# Patient Record
Sex: MALE | Race: WHITE | NOT HISPANIC OR LATINO | Employment: FULL TIME | URBAN - METROPOLITAN AREA
[De-identification: names, ages, dates, MRNs, and addresses within clinical notes are randomized per-mention and may not be internally consistent; named-entity substitution may affect disease eponyms.]

---

## 2023-07-28 ENCOUNTER — OFFICE VISIT (OUTPATIENT)
Dept: PRIMARY CARE | Facility: CLINIC | Age: 48
End: 2023-07-28
Payer: COMMERCIAL

## 2023-07-28 VITALS
SYSTOLIC BLOOD PRESSURE: 138 MMHG | DIASTOLIC BLOOD PRESSURE: 100 MMHG | TEMPERATURE: 97.7 F | WEIGHT: 221.8 LBS | OXYGEN SATURATION: 96 % | RESPIRATION RATE: 18 BRPM | BODY MASS INDEX: 31.75 KG/M2 | HEART RATE: 57 BPM | HEIGHT: 70 IN

## 2023-07-28 DIAGNOSIS — R53.81 MALAISE AND FATIGUE: ICD-10-CM

## 2023-07-28 DIAGNOSIS — R53.83 MALAISE AND FATIGUE: ICD-10-CM

## 2023-07-28 DIAGNOSIS — Z12.5 SCREENING PSA (PROSTATE SPECIFIC ANTIGEN): ICD-10-CM

## 2023-07-28 PROCEDURE — 99203 OFFICE O/P NEW LOW 30 MIN: CPT | Performed by: FAMILY MEDICINE

## 2023-07-28 PROCEDURE — 1036F TOBACCO NON-USER: CPT | Performed by: FAMILY MEDICINE

## 2023-07-28 RX ORDER — MELOXICAM 15 MG/1
1 TABLET ORAL DAILY
COMMUNITY
Start: 2023-07-24 | End: 2023-10-31 | Stop reason: ALTCHOICE

## 2023-07-28 ASSESSMENT — PATIENT HEALTH QUESTIONNAIRE - PHQ9
SUM OF ALL RESPONSES TO PHQ9 QUESTIONS 1 AND 2: 0
1. LITTLE INTEREST OR PLEASURE IN DOING THINGS: NOT AT ALL
2. FEELING DOWN, DEPRESSED OR HOPELESS: NOT AT ALL

## 2023-07-28 NOTE — PROGRESS NOTES
Subjective   Patient ID: Leopoldo Ko is a 48 y.o. male who presents for Establish Care.    HPI  Presents today for above reason  [facility]: [facility]  How did pt find us: Orthopedic office referred  Last eye exam: 1 yr. Pt has lasix  Last dental: 4-5 months  Eating healthy. Including: chicken, fish, fruit, vegetables  Sleeping well    Pt has torn LEFT meniscus and needed a PCP to  clear him  There is no date for surgery      BW as ordered/ Declined PSA test  Pt states he had [facility]    Review of Systems  Constitutional:  no chills, no fever and no night sweats.  Eyes: no blurred vision and no eyesight problems.  ENT: no hearing loss, no nasal congestion, no hoarseness and no sore throat.  Neck: no mass (es) and no swelling.  Cardiovascular: no chest pain, no intermittent leg claudication, no lower extremity edema, no palpitation and no syncope.  Respiratory: no cough, no shortness of breath during exertion, no shortness of breath at rest and no wheezing.  Gastrointestinal: no abdominal pain, no blood in stools, no constipation, no diarrhea, no melena, no nausea, no rectal pain and no vomiting.  Genitourinary: no dysuria, no change in urinary frequency, no urinary hesitancy and no feelings of urinary urgency.  Musculoskeletal: no arthralgias, no back pain and no myalgias.  Integumentary: no new skin lesions and no rashes.  Neurological: no difficulty walking, no headache, no limb weakness, no numbness and no tingling.  Psychiatric/Behavioral: no anxiety, no depression, no anhedonia and no substance use disorders.  Endocrine: no recent weight gain and no recent weight loss.  Hematologic/Lymphatic: no tendency for easy bruising and no swollen glands    Objective   Physical Exam  Patient here to establish care.  Injured his left knee he believes 6 months or so ago intermittent pain with running got worse and worse some intermittent locking and clicking MRI with orthopedic physician documents large  "tear of the meniscus in the left knee needs PCP for clearance.  Also chronic left shoulder pain injured that when he was in the  years ago.  No chronic medical problems blood pressure elevated today states he does not typically have a history of high blood pressure although he is had some red bull and caffeine this morning.  No family history of hypertension no significant medical problems.  Well-developed well-nourished male in no acute distress physical exam today's office visit constitutional alert and oriented x3.    Head is atraumatic HEENT is within normal limits.    Neck supple no masses full range of motion.    Thyroid is normal in size no thyromegaly there is no carotid bruits.    Pulmonary exam shows clear to auscultation no respiratory distress.    Cardiovascular shows no murmur rub or gallop.  Regular rate and rhythm.    Abdominal exam soft nontender no hepatosplenomegaly or masses normal bowel sounds no rebound no guarding.    Musculoskeletal exam no joint pain no muscle pain full range of motion.    Psych exam normal mood and affect.    Dermatologic exam no skin lesions no rash no blemishes.    Neuro exam is no focal deficits.  Normal exam.    Extremities no edema normal pulses normal capillary refill.  Pain tenderness and minimal swelling of the left knee is intermittently like clicking.  Blood pressure recheck my check is 152/102 markedly elevated.  BP (!) 138/100   Pulse 57   Temp 36.5 °C (97.7 °F)   Resp 18   Ht 1.778 m (5' 10\")   Wt 101 kg (221 lb 12.8 oz)   SpO2 96%   BMI 31.82 kg/m²     No results found for: \"WBC\", \"HGB\", \"HCT\", \"MCV\", \"PLT\"    Assessment/Plan plan is to have avoid salt and caffeine follow-up in 2 weeks for blood pressure recheck your blood drawn follow-up when we have those results.  He will contact Dr. Shrestha's office to get on the schedule for the knee surgery.  Problem List Items Addressed This Visit    None  Visit Diagnoses       Screening PSA (prostate " specific antigen)        Malaise and fatigue

## 2023-08-10 NOTE — PROGRESS NOTES
Subjective   Patient ID: Leopoldo Ko is a 48 y.o. male who presents for Pre-op Exam.  HPI  Patient presents today for a Pre-Op Exam. Is having a left knee scope on 8-31-23 at [facility], performed by Dr. Markie Shrestha. Blood work done on 8-7-23 with Dr. Shrestha. EKG was also done with them.     States the Meloxicam wears off by the afternoon. Wondering if it can  be taken BID instead of every day, or if there is something else he can take?      Taking current medications which were reviewed.  Problem list discussed.    Overall doing well.  Eating okay.  Staying active.    Has no other new problem /question.    Review of Systems  Constitutional:  no chills, no fever and no night sweats.  Eyes: no blurred vision and no eyesight problems.  ENT: no hearing loss, no nasal congestion, no hoarseness and no sore throat.  Neck: no mass (es) and no swelling.  Cardiovascular: no chest pain, no intermittent leg claudication, no lower extremity edema, no palpitation and no syncope.  Respiratory: no cough, no shortness of breath during exertion, no shortness of breath at rest and no wheezing.  Gastrointestinal: no abdominal pain, no blood in stools, no constipation, no diarrhea, no melena, no nausea, no rectal pain and no vomiting.  Genitourinary: no dysuria, no change in urinary frequency, no urinary hesitancy and no feelings of urinary urgency.  Musculoskeletal: no arthralgias, no back pain and no myalgias.  Integumentary: no new skin lesions and no rashes.  Neurological: no difficulty walking, no headache, no limb weakness, no numbness and no tingling.  Psychiatric/Behavioral: no anxiety, no depression, no anhedonia and no substance use disorders.  Endocrine: no recent weight gain and no recent weight loss.  Hematologic/Lymphatic: no tendency for easy bruising and no swollen glands    Objective   Physical Exam  Here for physical for clearance for left knee history of medial meniscal tear osteoarthritis due to have it  "scoped at the end of the month.  EKG is supposedly normal done by Dr. Shrestha's office.  He has no other complaints..Normal adult pain and tenderness at the left knee especially medially he has to stop his meloxicam 7 days before he is already having significant pain concerned that the left more pain when he asked to stop the meloxicam.  /86   Pulse 88   Temp 36.8 °C (98.2 °F)   Resp 16   Ht 1.778 m (5' 10\")   Wt 101 kg (221 lb 12.8 oz)   SpO2 99%   BMI 31.82 kg/m²     Lab Results   Component Value Date    WBC 3.4 (L) 08/07/2023    HGB 14.5 08/07/2023    HCT 43.4 08/07/2023    MCV 97 08/07/2023     08/07/2023       Assessment/Plan plan follow-up after the procedure is given pain medication to take as needed leading up to the surgery and then afterwards  Problem List Items Addressed This Visit    None  Visit Diagnoses       Pre-op exam    -  Primary    Chronic pain of left knee                "

## 2023-08-11 ENCOUNTER — OFFICE VISIT (OUTPATIENT)
Dept: PRIMARY CARE | Facility: CLINIC | Age: 48
End: 2023-08-11
Payer: COMMERCIAL

## 2023-08-11 VITALS
OXYGEN SATURATION: 99 % | BODY MASS INDEX: 31.75 KG/M2 | RESPIRATION RATE: 16 BRPM | DIASTOLIC BLOOD PRESSURE: 86 MMHG | TEMPERATURE: 98.2 F | SYSTOLIC BLOOD PRESSURE: 124 MMHG | HEART RATE: 88 BPM | WEIGHT: 221.8 LBS | HEIGHT: 70 IN

## 2023-08-11 DIAGNOSIS — M25.562 CHRONIC PAIN OF LEFT KNEE: ICD-10-CM

## 2023-08-11 DIAGNOSIS — G89.29 CHRONIC PAIN OF LEFT KNEE: ICD-10-CM

## 2023-08-11 DIAGNOSIS — Z01.818 PRE-OP EXAM: Primary | ICD-10-CM

## 2023-08-11 PROCEDURE — 1036F TOBACCO NON-USER: CPT | Performed by: FAMILY MEDICINE

## 2023-08-11 PROCEDURE — 99213 OFFICE O/P EST LOW 20 MIN: CPT | Performed by: FAMILY MEDICINE

## 2023-08-11 RX ORDER — HYDROCODONE BITARTRATE AND ACETAMINOPHEN 5; 325 MG/1; MG/1
1 TABLET ORAL EVERY 8 HOURS PRN
Qty: 21 TABLET | Refills: 0 | Status: SHIPPED | OUTPATIENT
Start: 2023-08-11 | End: 2023-08-21

## 2023-08-21 ENCOUNTER — TELEPHONE (OUTPATIENT)
Dept: PRIMARY CARE | Facility: CLINIC | Age: 48
End: 2023-08-21
Payer: COMMERCIAL

## 2023-08-21 DIAGNOSIS — M25.561 CHRONIC PAIN OF RIGHT KNEE: Primary | ICD-10-CM

## 2023-08-21 DIAGNOSIS — G89.29 CHRONIC PAIN OF RIGHT KNEE: Primary | ICD-10-CM

## 2023-08-21 RX ORDER — HYDROCODONE BITARTRATE AND ACETAMINOPHEN 10; 325 MG/1; MG/1
1 TABLET ORAL EVERY 6 HOURS PRN
Qty: 28 TABLET | Refills: 0 | Status: SHIPPED | OUTPATIENT
Start: 2023-08-21 | End: 2023-08-28

## 2023-08-21 NOTE — TELEPHONE ENCOUNTER
Pt calling, he was in on 8/11/23 for a pre-op exam. The patient is asking what he can take after surgery? He is out of the Hydrocodone acetaminophen 5-325 mg. He states that 1 per day did not seem like it was enough for the pain.       Please advise

## 2023-08-21 NOTE — TELEPHONE ENCOUNTER
SPOKE WITH PATIENT - HE IS REQUESTING THEM FOR NOW - HIS SURGERY IS NOT UNTIL 8/31/23 - ALSO THE 1 PILL A DAY IS NOT REALLY WORKING FOR HIM, CAN HE TAKE THEM SOONER OR IS THERE SOMETHING STRONGER?  PLEASE ADVISE.

## 2023-08-31 ENCOUNTER — HOSPITAL ENCOUNTER (OUTPATIENT)
Dept: DATA CONVERSION | Facility: HOSPITAL | Age: 48
End: 2023-08-31
Attending: ORTHOPAEDIC SURGERY | Admitting: ORTHOPAEDIC SURGERY
Payer: COMMERCIAL

## 2023-08-31 DIAGNOSIS — S83.242A OTHER TEAR OF MEDIAL MENISCUS, CURRENT INJURY, LEFT KNEE, INITIAL ENCOUNTER: ICD-10-CM

## 2023-09-29 PROBLEM — M25.569 KNEE PAIN: Status: ACTIVE | Noted: 2023-09-29

## 2023-09-29 PROBLEM — R26.9 ABNORMAL GAIT: Status: ACTIVE | Noted: 2023-09-29

## 2023-09-29 PROBLEM — M23.90 INTERNAL DERANGEMENT OF KNEE: Status: ACTIVE | Noted: 2023-09-29

## 2023-09-29 PROBLEM — G89.18 POST-OPERATIVE PAIN: Status: ACTIVE | Noted: 2023-09-29

## 2023-09-29 PROBLEM — S83.242A TEAR OF MEDIAL MENISCUS OF LEFT KNEE: Status: ACTIVE | Noted: 2023-09-29

## 2023-09-29 PROBLEM — M25.662 STIFFNESS OF LEFT KNEE, NOT ELSEWHERE CLASSIFIED: Status: ACTIVE | Noted: 2023-09-29

## 2023-09-29 PROBLEM — S83.90XA KNEE SPRAIN: Status: ACTIVE | Noted: 2023-09-29

## 2023-09-29 PROBLEM — R20.2 PARESTHESIA: Status: ACTIVE | Noted: 2023-09-29

## 2023-09-29 RX ORDER — HYDROCODONE BITARTRATE AND ACETAMINOPHEN 5; 325 MG/1; MG/1
1 TABLET ORAL EVERY 6 HOURS PRN
COMMUNITY
End: 2023-10-31 | Stop reason: ALTCHOICE

## 2023-09-29 RX ORDER — METHYLPREDNISOLONE 4 MG/1
4 TABLET ORAL
COMMUNITY
End: 2023-10-31 | Stop reason: ALTCHOICE

## 2023-10-01 NOTE — OP NOTE
PROCEDURE DETAILS      Postoperative Diagnosis:  Left knee medial meniscal tear  Surgeon: Markie Shrestha  Resident/Fellow/Other Assistant: Cathy Suggs    Procedure:  1. LEFT KNEE ARTHROSCOPY with partial medial meniscectomy  2.  Left knee arthroscopic chondroplasty of patellofemoral joint    Anesthesia: No anesthesiologist associated with this case  Estimated Blood Loss: Minimal  Findings: None        Operative Report:   Indications: The patient was noted to have a medial meniscal tear.  The patient also had underlying arthritic change.  The patient had failed multiple conservative  measures and elected to proceed with diagnostic arthroscopy and meniscectomy.  The risks and benefits of surgical intervention were noted with the patient and family.  These include infection, stiffness, nerve damage, continued pain as well as need for  surgical operations.  Specifically the failure of arthroscopy to treat any of the underlying arthritic condition was noted preoperatively.  Informed consent was obtained prior to arrival in the operating.    Procedure: Upon arrival the patient was verified and was transported from a stretcher to the operative table and placed in a supine position.  An LMA was admitted by the anesthesia staff.  Intravenous antibiotics were given prior to the start of the case.   No tourniquet was used during the procedure.  The left leg was prepped and draped in usual sterile fashion.  A standard inferior lateral arthroscopy portal was established and the arthroscope inserted into the suprasellar space.  The patella had some  grade 2 change noted throughout the apex.  There is a area of grade 1 change centrally in the trochlea.  The medial femoral condyle had grade 2 change throughout.  There is grade 1 and 2 change in the medial tibial plateau.  There is a tear to the posterior  medial aspect of the medial meniscus.  Under direct visualization an inferior medial portal was established.  The  medial meniscal tear was dissected back to stable tissue using an up-biting forceps and shaver.  Approximately 20% of the medial meniscus  was removed.  Final probing showed excellent stability to the remainder the meniscus including the anterior and posterior horn attachment. A gentle chondroplasty was performed medially.  The ACL and PCL were intact.  The posterior compartment was normal.   Lateral femoral condyle had some grade 1 change centrally.  There was some grade 1 change in the lateral tibial plateau. The lateral meniscus was normal.   Attention was directed back to the patellofemoral joint where a gentle chondroplasty was performed  again removing all loose fragments.  The knee was copiously irrigated through the shaver and suctioned dry.  Portal sites closed with 3-0 nylon suture.  All sponge and needle counts are correct prior to closure.  Sterile dressing was applied.  He was  awoken from the anesthetic and taken to the recovery room in stable condition.    Cathy Suggs PA-C was present throughout the entire case. Given the nature of the disease process and the procedure, a skilled surgical first  assistant was necessary during the case. The assistant was necessary to hold retractors and to manipulate the extremity during the procedure. A certified scrub tech was at the back table managing the instruments and supplies for the surgical case.   Note Recipients:   Oskar Mayes MD Zanotti, Daniel, MD - [phone] [Preferred]                        Attestation:   Note Completion:  Attending Attestation I performed the procedure without a resident         Electronic Signatures:  Markie Shrestha)  (Signed 31-Aug-2023 09:34)   Authored: Post-Operative Note, Chart Review, Note Completion      Last Updated: 31-Aug-2023 09:34 by Markie Shrestha)

## 2023-10-02 ENCOUNTER — OFFICE VISIT (OUTPATIENT)
Dept: ORTHOPEDIC SURGERY | Facility: CLINIC | Age: 48
End: 2023-10-02
Payer: COMMERCIAL

## 2023-10-02 DIAGNOSIS — S83.242D TEAR OF MEDIAL MENISCUS OF LEFT KNEE, CURRENT, UNSPECIFIED TEAR TYPE, SUBSEQUENT ENCOUNTER: Primary | ICD-10-CM

## 2023-10-02 PROCEDURE — 1036F TOBACCO NON-USER: CPT | Performed by: ORTHOPAEDIC SURGERY

## 2023-10-02 PROCEDURE — 99024 POSTOP FOLLOW-UP VISIT: CPT | Performed by: ORTHOPAEDIC SURGERY

## 2023-10-02 NOTE — PROGRESS NOTES
LT KNEE PARTIAL MENISCECTOMY FROM 8/31/23, 4 1/2 WEEKS OUT    History of Present Illness: Mr. Ko is here today for a recheck of his left knee.  He is 1 month from a left knee partial medial meniscectomy.  He still gets a bit of clicking and soreness in the knee but overall is doing well.    Physical Exam: On exam today the wounds are healing nicely.  He has full range of motion to the knee.  There is slight crepitus with range of motion.  Minimal pain with patellar ballottement.  No pain medially or laterally over the joint line.  He is neurovascularly intact.    Radiographs: None    Assessment: Stable left knee, status post arthroscopic partial medial meniscectomy, 1 month out.    Plan: He is doing well but still has a bit of pain and clicking at times.  I would like him to continue with his formal therapy.  He is hoping to get back to work on November 13 and was given an updated order for this today.  He is also going to drop off work paperwork for us to fill out and fax over.  We would be happy to see him back if he should have any issues otherwise if continuing to do well, he can follow-up on an as-needed basis.  All questions were answered with the patient and with Dr. Shrestha.

## 2023-10-02 NOTE — LETTER
October 2, 2023     Leopoldo Ko  [address] [facility] [id number]    Patient: Leopoldo Ko   YOB: 1975   Date of Visit: 10/2/2023       Dear Dr. Leopoldo Ko:    Thank you for referring Leopoldo Ko to me for evaluation. Below are my notes for this consultation.  If you have questions, please do not hesitate to call me. I look forward to following your patient along with you.       Sincerely,     Markie Shrestha MD      CC: No Recipients  ______________________________________________________________________________________

## 2023-10-02 NOTE — LETTER
October 2, 2023     Patient: Leopoldo Ko   YOB: 1975   Date of Visit: 10/2/2023       To Whom It May Concern:    It is my medical opinion that Leopoldo Ko may return to work on 11/13/23. .    If you have any questions or concerns, please don't hesitate to call.         Sincerely,        Markie Shrestha MD    CC: No Recipients

## 2023-10-03 ENCOUNTER — TREATMENT (OUTPATIENT)
Dept: PHYSICAL THERAPY | Facility: CLINIC | Age: 48
End: 2023-10-03
Payer: COMMERCIAL

## 2023-10-03 DIAGNOSIS — M25.569 KNEE PAIN: Primary | ICD-10-CM

## 2023-10-03 DIAGNOSIS — M25.562 LEFT KNEE PAIN, UNSPECIFIED CHRONICITY: ICD-10-CM

## 2023-10-03 DIAGNOSIS — R26.9 ABNORMAL GAIT: ICD-10-CM

## 2023-10-03 DIAGNOSIS — M25.662 STIFFNESS OF LEFT KNEE, NOT ELSEWHERE CLASSIFIED: ICD-10-CM

## 2023-10-03 PROCEDURE — 97110 THERAPEUTIC EXERCISES: CPT | Mod: GP

## 2023-10-03 ASSESSMENT — PAIN - FUNCTIONAL ASSESSMENT: PAIN_FUNCTIONAL_ASSESSMENT: 0-10

## 2023-10-03 ASSESSMENT — PAIN DESCRIPTION - DESCRIPTORS: DESCRIPTORS: ACHING

## 2023-10-03 ASSESSMENT — PAIN SCALES - GENERAL: PAINLEVEL_OUTOF10: 3

## 2023-10-03 NOTE — PROGRESS NOTES
Physical Therapy    Physical Therapy Treatment    Patient Name: Leopoldo Ko  MRN: [id number]  Today's Date: 10/3/2023    Insurance    Insurance reviewed   Visit number: 4   Approved number of visits: 10   Authorization not required after evaluation         Assessment: Patient progressing towards all goals. No reports of additional pain with or after exercises.  Patient would benefit from P.T. to continue to address impairments in order to improve strength, flexibility, posture, balance, and body mechanics, and to decrease symptoms and increase overall function.              Plan:  OP PT Plan  Treatment/Interventions: Cryotherapy, Dry needling, Education/ Instruction, Electrical stimulation, Gait training, Hot pack, Manual therapy, Neuromuscular re-education, Orthosis fit/ training, Taping techniques, Therapeutic activities, Therapeutic exercises, Ultrasound  PT Plan: Skilled PT  PT Frequency: 2 times per week  Duration: 5 weeks  Number of Treatments Authorized: 10  Rehab Potential: Excellent    Continue LE strengthening gait and balance training.    Current Problem  1. Knee pain        2. Stiffness of left knee, not elsewhere classified        3. Abnormal gait            Subjective   General     Precautions none  Precautions  STEADI Fall Risk Score (The score of 4 or more indicates an increased risk of falling): 0  Vital Signs     Pain  Pain Assessment: 0-10  Pain Score: 3  Pain Type: Surgical pain  Pain Location: Knee  Pain Orientation: Left  Pain Descriptors: Aching    Objective   Left knee AROM 0-124 degrees     Treatments:  Therapeutic exercise (51692): timed minutes 45, units 3   . Bike x 7 minutes, resistance 8.0     3 way calf stretches on step 30 sec each B  Lunge stretch on step, 2 sets, 10 reps  HS stretches 3 x 30 sec B.  Forward/Lateral Step-Ups 6 inch step, 2 sets, 12 reps each  HR x 10 reps on step 3 way  Cable Column TKE's 5.5 plates 3 sets, 10 reps  Cybex Leg Press 2 plates, 2 sets, 10  reps  supine SLR flexion 2 sets, 15 reps  supine bridges 2 sets, 15 reps  Hallway marches and side steps 25 feet each way  [facility] 20 x L

## 2023-10-05 ENCOUNTER — TREATMENT (OUTPATIENT)
Dept: PHYSICAL THERAPY | Facility: CLINIC | Age: 48
End: 2023-10-05
Payer: COMMERCIAL

## 2023-10-05 DIAGNOSIS — M25.562 LEFT KNEE PAIN, UNSPECIFIED CHRONICITY: ICD-10-CM

## 2023-10-05 DIAGNOSIS — R26.9 ABNORMAL GAIT: ICD-10-CM

## 2023-10-05 DIAGNOSIS — M25.662 STIFFNESS OF LEFT KNEE, NOT ELSEWHERE CLASSIFIED: Primary | ICD-10-CM

## 2023-10-05 PROCEDURE — 97110 THERAPEUTIC EXERCISES: CPT | Mod: GP

## 2023-10-05 ASSESSMENT — PAIN SCALES - GENERAL: PAINLEVEL_OUTOF10: 3

## 2023-10-05 ASSESSMENT — PAIN - FUNCTIONAL ASSESSMENT: PAIN_FUNCTIONAL_ASSESSMENT: 0-10

## 2023-10-05 NOTE — PROGRESS NOTES
Physical Therapy    Physical Therapy Treatment    Patient Name: Leopoldo Ko  MRN: [id number]  Today's Date: 10/5/2023    Insurance    Insurance reviewed   Visit number: 5  Approved number of visits: 10   Authorization not required after evaluation  Time Calculation  Start Time: 0825  Stop Time: 0915  Time Calculation (min): 50 min      Assessment: Patient progressing towards all goals. No reports of additional pain with or after exercises.  Patient would benefit from P.T. to continue to address impairments in order to improve strength, flexibility, posture, balance, and body mechanics, and to decrease symptoms and increase overall function.              Plan:  OP PT Plan  Treatment/Interventions: Cryotherapy, Dry needling, Education/ Instruction, Electrical stimulation, Gait training, Hot pack, Manual therapy, Neuromuscular re-education, Orthosis fit/ training, Taping techniques, Therapeutic activities, Therapeutic exercises, Ultrasound  PT Plan: Skilled PT  PT Frequency: 2 times per week  Duration: 5 weeks  Number of Treatments Authorized: 10  Rehab Potential: Excellent    Continue LE strengthening gait and balance training.    Current Problem  1. Stiffness of left knee, not elsewhere classified        2. Abnormal gait        3. Left knee pain, unspecified chronicity              Subjective   General     Precautions none  Precautions  STEADI Fall Risk Score (The score of 4 or more indicates an increased risk of falling): 0  Vital Signs     Pain  Pain Assessment: 0-10  Pain Score: 3  Pain Type: Surgical pain  Pain Location: Knee  Pain Orientation: Left    Objective   Left knee AROM 0-124 degrees     Treatments:  Therapeutic exercise (77087): timed minutes 45, units 3   . Bike x 7 minutes, resistance 8.0     3 way calf stretches on step 30 sec each B  Lunge stretch on step, 10 sec, 10 reps  HS stretches 3 x 30 sec B.  Forward/Lateral Step-Ups 6 inch step, 2 sets, 15 reps each  HR x 12 reps on step 3 way  Cable  Column TKE's 5.5 plates 2 sets, 12 reps  Cybex Leg Press 3 plates, 2 sets, 10 reps with ball squeeze  Cybex LAQ 15# eccentrics 12 x 2 sets  Cybex HS 35# eccentrics 10 x 2 sets  supine SLR flexion 2 sets, 12 reps 3#  supine bridges with kick outs2 sets, 12 reps  Hallway marches and side steps 25 feet each way  [facility] 20 x L

## 2023-10-17 ENCOUNTER — TREATMENT (OUTPATIENT)
Dept: PHYSICAL THERAPY | Facility: CLINIC | Age: 48
End: 2023-10-17
Payer: COMMERCIAL

## 2023-10-17 DIAGNOSIS — M25.662 STIFFNESS OF LEFT KNEE, NOT ELSEWHERE CLASSIFIED: Primary | ICD-10-CM

## 2023-10-17 DIAGNOSIS — R26.9 ABNORMAL GAIT: ICD-10-CM

## 2023-10-17 DIAGNOSIS — M25.562 LEFT KNEE PAIN, UNSPECIFIED CHRONICITY: ICD-10-CM

## 2023-10-17 PROCEDURE — 97110 THERAPEUTIC EXERCISES: CPT | Mod: GP

## 2023-10-17 ASSESSMENT — PAIN SCALES - GENERAL: PAINLEVEL_OUTOF10: 0 - NO PAIN

## 2023-10-17 ASSESSMENT — PAIN - FUNCTIONAL ASSESSMENT: PAIN_FUNCTIONAL_ASSESSMENT: 0-10

## 2023-10-17 NOTE — PROGRESS NOTES
Physical Therapy        Physical Therapy Treatment    Patient Name: Leopoldo Ko  MRN: [id number]  Today's Date: 10/17/2023    Insurance    Insurance reviewed   Visit number: 6  Approved number of visits: 10   Authorization not required after evaluation  Time Calculation  Start Time: 0750  Stop Time: 0847  Time Calculation (min): 57 min      Assessment: Patient progressing towards all goals. No reports of additional pain with or after exercises. Still minor gait deviations due to weak Gluteals and forward posture.  Patient would benefit from P.T. to continue to address impairments in order to improve strength, flexibility, posture, balance, and body mechanics, and to decrease symptoms and increase overall function.              Plan:     Check all goals next visit to reassess.  Continue LE strengthening gait and balance training.    Current Problem  1. Stiffness of left knee, not elsewhere classified        2. Abnormal gait        3. Left knee pain, unspecified chronicity                Subjective   General   Reports being sore in left quad and hamstrings the next day, after last visit.  Precautions none  Precautions  STEADI Fall Risk Score (The score of 4 or more indicates an increased risk of falling): 0  Vital Signs     Pain  Pain Assessment: 0-10  Pain Score: 0 - No pain  Pain Location: Knee  Pain Orientation: Left    Objective   Anterior COG with some trunk lean over left stance phase of gait.     Treatments:  Therapeutic exercise (78869): timed minutes 53, units 4   . Bike x 6 minutes, resistance 8.0     3 way calf stretches on step 30 sec each B  Quad stretches on stool 3 x   HS stretches on step 3 way x 30 sec B.  Forward/Lateral Step-Ups 8 inch step, 2 sets, 10 reps each  HR x 12 reps on step 3 way  Cable Column TKE's 6 plates 2 sets, 12 reps  Cybex Leg Press 3 plates, 2 sets, 12 reps with ball squeeze  Cybex LAQ 15# eccentrics 12 x 2 sets  Cybex HS 35# eccentrics 12 x 2 sets  prone hip ext with knee  extended and flexed 12 reps each way  supine bridges with kick outs2 12 reps  Resisted walking 4 way heavy cord 4 each way  Clamshells 15 x L BTB 2 way    10-: [facility] added clamshells with BTB, prone hip extens*ion with bent and straight knee.

## 2023-10-31 ENCOUNTER — DOCUMENTATION (OUTPATIENT)
Dept: PHYSICAL THERAPY | Facility: CLINIC | Age: 48
End: 2023-10-31
Payer: COMMERCIAL

## 2023-10-31 ENCOUNTER — OFFICE VISIT (OUTPATIENT)
Dept: URGENT CARE | Facility: CLINIC | Age: 48
End: 2023-10-31
Payer: COMMERCIAL

## 2023-10-31 VITALS
HEART RATE: 81 BPM | WEIGHT: 232 LBS | DIASTOLIC BLOOD PRESSURE: 98 MMHG | SYSTOLIC BLOOD PRESSURE: 135 MMHG | TEMPERATURE: 98.2 F | OXYGEN SATURATION: 98 % | BODY MASS INDEX: 33.29 KG/M2 | RESPIRATION RATE: 16 BRPM

## 2023-10-31 DIAGNOSIS — M43.6 ACQUIRED TORTICOLLIS: Primary | ICD-10-CM

## 2023-10-31 DIAGNOSIS — S16.1XXA ACUTE CERVICAL MYOFASCIAL STRAIN, INITIAL ENCOUNTER: ICD-10-CM

## 2023-10-31 DIAGNOSIS — M54.12 BRACHIAL RADICULITIS: ICD-10-CM

## 2023-10-31 PROCEDURE — 99203 OFFICE O/P NEW LOW 30 MIN: CPT | Performed by: FAMILY MEDICINE

## 2023-10-31 PROCEDURE — 1036F TOBACCO NON-USER: CPT | Performed by: FAMILY MEDICINE

## 2023-10-31 RX ORDER — CYCLOBENZAPRINE HCL 10 MG
10 TABLET ORAL EVERY 8 HOURS PRN
Qty: 30 TABLET | Refills: 0 | Status: SHIPPED | OUTPATIENT
Start: 2023-10-31 | End: 2023-11-10

## 2023-10-31 RX ORDER — PREDNISONE 20 MG/1
TABLET ORAL
Qty: 20 TABLET | Refills: 0 | Status: SHIPPED | OUTPATIENT
Start: 2023-10-31

## 2023-10-31 ASSESSMENT — ENCOUNTER SYMPTOMS
ABDOMINAL PAIN: 0
WHEEZING: 0
RHINORRHEA: 0
NECK STIFFNESS: 1
NAUSEA: 0
SINUS PRESSURE: 0
COUGH: 0
VOMITING: 0
CONSTIPATION: 0
FREQUENCY: 0
NECK PAIN: 1
SORE THROAT: 0
PALPITATIONS: 0
DYSURIA: 0
CHEST TIGHTNESS: 0
MYALGIAS: 1
CHILLS: 0
TINGLING: 1
DIARRHEA: 0
SHORTNESS OF BREATH: 0

## 2023-10-31 ASSESSMENT — PAIN SCALES - GENERAL: PAINLEVEL: 7

## 2023-10-31 NOTE — PROGRESS NOTES
Physical Therapy    Discharge Summary    Name: Leopoldo Ko  MRN: [id number]  : 1975  Date: 10/31/23    Discharge Summary: PT    Discharge Information: Date of discharge 10-, Date of last visit 10-, Date of evaluation 2023, Number of attended visits 6/10, Referred by Cathy Suggs PA-C, and Referred for LEFT KNEE INTERNAL DERANGEMENT    Therapy Summary: no-showed 3 visits.    Discharge Status: discharge to [facility]     Rehab Discharge Reason: Failed to schedule and/or keep follow-up appointment(s)

## 2023-10-31 NOTE — PROGRESS NOTES
Subjective   Patient ID: Leopoldo Ko is a 48 y.o. male.     48-year-old -American male presents with a complaint of sudden onset of a severe left-sided acute torticollis.  Reports that he simply moved his neck about 2 hours ago and had sudden onset of sharp pain and muscle spasm affecting the left side of the cervical spine.   reports no history of similar complaint.      History provided by:  Patient  Neck Pain  Pain location:  L side  Quality:  Cramping, shooting and stiffness  Pain radiates to:  L shoulder  Pain severity:  Severe  Pain is:  Same all the time  Onset quality:  Sudden  Duration:  2 hours  Timing:  Constant  Progression:  Unchanged  Chronicity:  New  Relieved by:  None tried  Worsened by:  Position  Ineffective treatments:  None tried  Associated symptoms: tingling        The following portions of the chart were reviewed this encounter and updated as appropriate:  Tobacco  Allergies  Meds  Problems  Med Hx  Surg Hx  Fam Hx         Review of Systems   Constitutional:  Negative for chills.   HENT:  Negative for congestion, ear pain, rhinorrhea, sinus pressure and sore throat.    Respiratory:  Negative for cough, chest tightness, shortness of breath and wheezing.    Cardiovascular:  Negative for palpitations.   Gastrointestinal:  Negative for abdominal pain, constipation, diarrhea, nausea and vomiting.   Genitourinary:  Negative for dysuria and frequency.   Musculoskeletal:  Positive for myalgias, neck pain and neck stiffness.   Neurological:  Positive for tingling.     Objective   Physical Exam    Vital signs are reviewed. Alert and oriented x3 with normal mood and affect  Patient is well nourished, well-developed, alert and in no acute distress  No pain to palpation over frontal, ethmoid or maxillary sinus areas    External eyes, orbits, conjunctiva and eyelids are normal in appearance  Pupils are equal, round, reactive to light and accommodation, extraocular movements  intact    External ears appear normal  External canals are normal in appearance  Right tympanic membrane is intact and pale gray in appearance  Left tympanic membrane is intact and pale gray in appearance  There is no middle ear effusion noted on the right  There is no middle ear effusion noted on the left  External appearance of the nose is normal  Nasal mucosa, septum, turbinates are pink in appearance  There is no nasal discharge in both nares    Oral mucosa is uniformly pink and moist  Palate is pink, symmetric and intact  Tongue is moist, mobile and midline  Posterior pharynx not erythematous with no concretions or exudates present  No cervical lymphadenopathy palpated    Heart has regular rate and rhythm. No murmurs, rubs or gallops are auscultated at this exam.    Respiratory rate rhythm and effort are normal. Breath sounds bilaterally are clear on auscultation without crackles, rhonchi, wheezes or friction rub.    Abdomen: Normal bowel sounds on auscultation. Soft, nontender without rebound or rigidity on palpation    Musculoskeletal: Patient has significant pain and muscle spasm on the posterior strap muscles, trapezius and supraspinatus muscles involving the left shoulder.  He is unable to rest his head flat on the cot during exam.  He denies any pain on the right side of his neck.  Denies pain over the spinous processes of the cervical vertebrae.  Denies pain or muscle spasm of the SCM bilaterally.  Reports slight radiculitis with pain extending to the margins of the deltoid on the left upper arm.   No mobilization possible secondary to severity of spasm and level of patient discomfort.        Procedures    Assessment/Plan   Diagnoses and all orders for this visit:  Acquired torticollis  -     predniSONE (Deltasone) 20 mg tablet; 3 tabs p.o. daily x3, 2 tabs  p.o. daily x3, 1 tab p.o. daily x3, one half tab p.o. daily x4  -     cyclobenzaprine (Flexeril) 10 mg tablet; Take 1 tablet (10 mg) by mouth every  8 hours if needed for muscle spasms for up to 10 days.  Acute cervical myofascial strain, initial encounter  -     predniSONE (Deltasone) 20 mg tablet; 3 tabs p.o. daily x3, 2 tabs  p.o. daily x3, 1 tab p.o. daily x3, one half tab p.o. daily x4  -     cyclobenzaprine (Flexeril) 10 mg tablet; Take 1 tablet (10 mg) by mouth every 8 hours if needed for muscle spasms for up to 10 days.  Brachial radiculitis  -     predniSONE (Deltasone) 20 mg tablet; 3 tabs p.o. daily x3, 2 tabs  p.o. daily x3, 1 tab p.o. daily x3, one half tab p.o. daily x4  -     cyclobenzaprine (Flexeril) 10 mg tablet; Take 1 tablet (10 mg) by mouth every 8 hours if needed for muscle spasms for up to 10 days.

## 2024-06-27 ENCOUNTER — APPOINTMENT (OUTPATIENT)
Dept: ORTHOPEDIC SURGERY | Facility: CLINIC | Age: 49
End: 2024-06-27
Payer: COMMERCIAL

## 2024-06-27 ENCOUNTER — OFFICE VISIT (OUTPATIENT)
Dept: ORTHOPEDIC SURGERY | Facility: CLINIC | Age: 49
End: 2024-06-27
Payer: COMMERCIAL

## 2024-06-27 ENCOUNTER — HOSPITAL ENCOUNTER (OUTPATIENT)
Dept: RADIOLOGY | Facility: CLINIC | Age: 49
Discharge: HOME | End: 2024-06-27
Payer: COMMERCIAL

## 2024-06-27 VITALS — BODY MASS INDEX: 34.8 KG/M2 | HEIGHT: 69 IN | WEIGHT: 235 LBS

## 2024-06-27 DIAGNOSIS — M25.562 LEFT KNEE PAIN: ICD-10-CM

## 2024-06-27 DIAGNOSIS — M17.12 PRIMARY OSTEOARTHRITIS OF LEFT KNEE: Primary | ICD-10-CM

## 2024-06-27 PROCEDURE — 1036F TOBACCO NON-USER: CPT | Performed by: FAMILY MEDICINE

## 2024-06-27 PROCEDURE — 73562 X-RAY EXAM OF KNEE 3: CPT | Mod: LT

## 2024-06-27 PROCEDURE — 99214 OFFICE O/P EST MOD 30 MIN: CPT | Performed by: FAMILY MEDICINE

## 2024-06-27 PROCEDURE — 73562 X-RAY EXAM OF KNEE 3: CPT | Mod: LEFT SIDE | Performed by: RADIOLOGY

## 2024-06-27 NOTE — PROGRESS NOTES
History of Present Illness   Chief Complaint   Patient presents with    Left Knee - Pain     Nki  Pain x several months       The patient is 49 y.o. male  here with a complaint of left knee pain.  Symptoms have been ongoing for greater than 1 year.  [facility], he did have prior MRI last year that showed medial meniscus tear with mild degenerative changes in the knee.  He denies any injury prior to onset of symptoms, started after playing basketball at a graduation party the next day.  He was treated with knee arthroscopy with partial medial meniscectomy and a chondroplasty by Dr. Shrestha in August 2023.  He did have some improvement in symptoms following surgery but feels like he never fully recovered, pain or fully resolved.  He did do physical therapy for few months following surgery.  He previously was a runner, ran multiple marathons and half marathons, was hoping that he can get back into running after surgery but definitely does not feel like he can do this.  He does admit to pain with day-to-day activity specifically with side-to-side movement, twisting of his knee, less pain with normal walking, some pain with stairs, he does have popping and clicking.  He points to the medial aspect of his knee as area of discomfort, he denies any swelling.  He takes Excedrin on occasion for his pain.  He says that both of his parents have had knee replacement surgery.  Patient works in IT          No past medical history on file.    Medication Documentation Review Audit       Reviewed by Shimon Bryant DO (Physician) on 10/31/23 at 1241      Medication Order Taking? Sig Documenting Provider Last Dose Status   Discontinued 10/31/23 1220   Discontinued 10/31/23 1220   Discontinued 10/31/23 1220                    No Known Allergies    Social History     Socioeconomic History    Marital status: Single     Spouse name: Not on file    Number of children: Not on file     Years of education: Not on file    Highest education level: Not on file   Occupational History    Not on file   Tobacco Use    Smoking status: Never    Smokeless tobacco: Never   Substance and Sexual Activity    Alcohol use: Yes     Alcohol/week: 2.0 standard drinks of alcohol     Types: 2 Cans of beer per week    Drug use: Never    Sexual activity: Not on file   Other Topics Concern    Not on file   Social History Narrative    Not on file     Social Determinants of Health     Financial Resource Strain: Not on file   Food Insecurity: Not on file   Transportation Needs: Not on file   Physical Activity: Not on file   Stress: Not on file   Social Connections: Not on file   Intimate Partner Violence: Not on file   Housing Stability: Not on file       No past surgical history on file.       Review of Systems   GENERAL: Negative  GI: Negative  MUSCULOSKELETAL: See HPI  SKIN: Negative  NEURO:  Negative     Physical Exam:    General/Constitutional: well appearing, no distress, appears stated age  HEENT: sclera clear  Respiratory: non labored breathing  Vascular: No edema, swelling or tenderness, except as noted in detailed exam.  Integumentary: No impressive skin lesions present, except as noted in detailed exam.  Neurological:  Alert and oriented   Psychological:  Normal mood and affect.  Musculoskeletal: Normal, except as noted in detailed exam and in HPI.  Normal gait, unassisted      Left knee: Normal appearance, no swelling, no redness or warmth, no joint effusion.  There is some focal medial joint line tenderness.  He has good range of motion from 0 to greater than 120 degrees of flexion, there is some crepitus but no pain.  No motor deficits present at the knee, no pain with strength testing.  Minimal discomfort with Bismark testing.  Negative Lachman, negative posterior drawer.  Stable to varus valgus stress     Imaging: New x-rays of left knee obtained today and independently reviewed, there is interval  worsening medial joint space narrowing, overall moderate in severity, there is some Tricut mental osteophytosis      Assessment   1. Primary osteoarthritis of left knee          Left knee pain, status post left knee arthroscopy for medial meniscus tear 1 year ago, he has developed worsening knee osteoarthritis on repeat x-rays today which is likely the cause of his symptoms, moderate in severity on today's x-rays    Plan: Discussed diagnosis, reviewed x-rays, treatment with patient, he has been doing physical therapy postoperatively with ongoing symptoms, taking over-the-counter medications.  We discussed alternative treatments including injection with corticosteroid, hyaluronic acid as well as PRP.  We discussed potential need for knee replacement surgery at some point in the future.  Patient was interested in pursuing PRP injection, we discussed risks and benefits of this.  We discussed that it is not covered by insurance and is cash pay at time of visit which she is okay with.  He needs to be off Excedrin, all other NSAIDs for at least 10 to 14 days prior to his procedure.  He would like to do this approximately 1 month from now.

## 2024-07-12 ENCOUNTER — APPOINTMENT (OUTPATIENT)
Dept: ORTHOPEDIC SURGERY | Facility: CLINIC | Age: 49
End: 2024-07-12
Payer: COMMERCIAL

## 2024-07-12 ENCOUNTER — HOSPITAL ENCOUNTER (OUTPATIENT)
Dept: RADIOLOGY | Facility: CLINIC | Age: 49
Discharge: HOME | End: 2024-07-12
Payer: COMMERCIAL

## 2024-07-12 VITALS — WEIGHT: 235 LBS | BODY MASS INDEX: 34.8 KG/M2 | HEIGHT: 69 IN

## 2024-07-12 DIAGNOSIS — M25.511 ACUTE PAIN OF RIGHT SHOULDER: ICD-10-CM

## 2024-07-12 DIAGNOSIS — M75.81 ROTATOR CUFF TENDINITIS, RIGHT: Primary | ICD-10-CM

## 2024-07-12 PROCEDURE — 73030 X-RAY EXAM OF SHOULDER: CPT | Mod: RT

## 2024-07-12 PROCEDURE — 73030 X-RAY EXAM OF SHOULDER: CPT | Mod: RIGHT SIDE | Performed by: RADIOLOGY

## 2024-07-12 RX ORDER — LIDOCAINE HYDROCHLORIDE 10 MG/ML
2 INJECTION INFILTRATION; PERINEURAL
Status: COMPLETED | OUTPATIENT
Start: 2024-07-12 | End: 2024-07-12

## 2024-07-12 RX ORDER — TRIAMCINOLONE ACETONIDE 40 MG/ML
40 INJECTION, SUSPENSION INTRA-ARTICULAR; INTRAMUSCULAR
Status: COMPLETED | OUTPATIENT
Start: 2024-07-12 | End: 2024-07-12

## 2024-07-12 NOTE — PROGRESS NOTES
49-year-old is seen with right shoulder pain.  Has been having persistent moderate throbbing pain in the right shoulder.  Pain is worse with overhead reaching and lifting activities.  He said pain for approximately 20 years.  He is right-hand dominant.  [facility]  He also has a history of the left shoulder instability.  He does IT work.    Pleasant and no acute distress.  Right shoulder forward flexion 160°. No effusion or instability. There is positive Neer and Burroughs impingement. There is subacromial crepitus and tenderness around the subacromial space. No biceps tenderness. No acromioclavicular tenderness. Rotator cuff strength is intact but there is discomfort with strength testing. Left shoulder forward flexion 160°. No effusion or instability. There is impingement an crepitus and tenderness involving the subacromial space. There is positive Neer and Burroughs impingement. No biceps tenderness. No acromioclavicular tenderness. Rotator cuff strength is intact but there is discomfort with strength testing. There is adequate range of motion of the cervical spine without pain. Both upper extremities are well perfused, skin is intact and muscle tone is adequate. Elbow flexion and extension and wrist flexion and extension strength are intact.    Multiple x-ray views of the right shoulder are personally reviewed and there is no acute bony abnormality.    MRI of the right shoulder was personally reviewed and there is glenohumeral chondral change.  There is irregularity involving the anterior inferior labrum.  There is acromioclavicular arthrosis.  There is rotator cuff tendinosis but the tendons are intact to the lesser and greater tuberosities.    Detailed discussion of rotator cuff tendinitis was done.  Treatment options were reviewed.  He will perform physical therapy.  Decision was made proceed with cortisone injection.  He can use Tylenol or ibuprofen.  If he is not  improving he will return in follow-up consideration could be given to shoulder arthroscopy with subacromial decompression.    L Inj/Asp: R subacromial bursa on 7/12/2024 4:41 PM  Indications: pain  Details: 22 G needle, posterior approach  Medications: 40 mg triamcinolone acetonide 40 mg/mL; 2 mL lidocaine 10 mg/mL (1 %)  Procedure, treatment alternatives, risks and benefits explained, specific risks discussed. Consent was given by the patient.

## 2024-07-19 ENCOUNTER — HOSPITAL ENCOUNTER (OUTPATIENT)
Dept: RADIOLOGY | Facility: EXTERNAL LOCATION | Age: 49
Discharge: HOME | End: 2024-07-19

## 2024-07-22 ENCOUNTER — TELEPHONE (OUTPATIENT)
Dept: ORTHOPEDIC SURGERY | Facility: CLINIC | Age: 49
End: 2024-07-22
Payer: COMMERCIAL

## 2024-07-22 NOTE — TELEPHONE ENCOUNTER
PT CALLED AND STATED THAT HIS INJECTION ONLY LASTED ABOUT A DAY. HE STATES HE DOESN'T WANT TO DO PHYSICAL THERAPY, HE WOULD LIKE TO GO FOR AN MRI? OK TO SEND HIM FOR ONE?

## 2024-07-23 ENCOUNTER — OFFICE VISIT (OUTPATIENT)
Dept: ORTHOPEDIC SURGERY | Facility: CLINIC | Age: 49
End: 2024-07-23
Payer: COMMERCIAL

## 2024-07-23 DIAGNOSIS — S43.431A LABRAL TEAR OF SHOULDER, RIGHT, INITIAL ENCOUNTER: Primary | ICD-10-CM

## 2024-07-23 PROCEDURE — 1036F TOBACCO NON-USER: CPT | Performed by: ORTHOPAEDIC SURGERY

## 2024-07-23 PROCEDURE — 99214 OFFICE O/P EST MOD 30 MIN: CPT | Performed by: ORTHOPAEDIC SURGERY

## 2024-07-23 RX ORDER — CEFAZOLIN SODIUM 2 G/100ML
2 INJECTION, SOLUTION INTRAVENOUS ONCE
OUTPATIENT
Start: 2024-07-23 | End: 2024-07-23

## 2024-07-23 RX ORDER — SODIUM CHLORIDE, SODIUM LACTATE, POTASSIUM CHLORIDE, CALCIUM CHLORIDE 600; 310; 30; 20 MG/100ML; MG/100ML; MG/100ML; MG/100ML
100 INJECTION, SOLUTION INTRAVENOUS CONTINUOUS
OUTPATIENT
Start: 2024-07-23

## 2024-07-23 NOTE — PROGRESS NOTES
49-year-old is seen with right shoulder pain.  Has been having persistent moderate throbbing pain in the right shoulder.  Pain is worse with overhead reaching and lifting activities.  He said pain for approximately 20 years.  He is right-hand dominant.  [facility]  He also has a history of the left shoulder instability.  He does IT work.  He had a cortisone injection at his last visit they gave him a little over 1 day of good relief.    Pleasant and no acute distress.  Right shoulder forward flexion 160°. No effusion or instability. There is positive Neer and Burroughs impingement. There is subacromial crepitus and tenderness around the subacromial space. No biceps tenderness. No acromioclavicular tenderness. Rotator cuff strength is intact but there is discomfort with strength testing. Left shoulder forward flexion 160°. No effusion or instability. There is impingement an crepitus and tenderness involving the subacromial space. There is positive Neer and Burroughs impingement. No biceps tenderness. No acromioclavicular tenderness. Rotator cuff strength is intact but there is discomfort with strength testing. There is adequate range of motion of the cervical spine without pain. Both upper extremities are well perfused, skin is intact and muscle tone is adequate. Elbow flexion and extension and wrist flexion and extension strength are intact.    Multiple x-ray views of the right shoulder are personally reviewed and there is no acute bony abnormality.    MRI of the right shoulder was personally reviewed and there is glenohumeral chondral change.  There is irregularity involving the anterior inferior labrum.  There is acromioclavicular arthrosis.  There is rotator cuff tendinosis but the tendons are intact to the lesser and greater tuberosities.    Detailed discussion of rotator cuff tendinitis was done and discussion about shoulder arthritis was done.  Treatment options  including no treatment reviewed and the decision was made to proceed with right shoulder arthroscopy with extensive debridement of the rotator cuff and labrum and chondral surfaces and subacromial decompression.  Treatment of the rotator cuff would be done as needed if a repair were required.  Surgery and postoperative course with and without repair was reviewed.  Risks including but not limited to infection thromboembolus neurovascular injury medical problems stiffness and limitations of arthroscopy were discussed and he understands this and has elected to proceed.  Realistic expectations in regard to management of the chondral change was discussed.  He will continue with the exercises and avoid aggravating activities in the meantime.

## 2024-07-25 PROBLEM — S43.431A LABRAL TEAR OF SHOULDER, RIGHT, INITIAL ENCOUNTER: Status: ACTIVE | Noted: 2024-07-23

## 2024-07-26 ENCOUNTER — APPOINTMENT (OUTPATIENT)
Dept: PHYSICAL THERAPY | Facility: CLINIC | Age: 49
End: 2024-07-26
Payer: COMMERCIAL

## 2024-07-29 ENCOUNTER — APPOINTMENT (OUTPATIENT)
Dept: ORTHOPEDIC SURGERY | Facility: CLINIC | Age: 49
End: 2024-07-29

## 2024-08-02 ENCOUNTER — APPOINTMENT (OUTPATIENT)
Dept: PHYSICAL THERAPY | Facility: CLINIC | Age: 49
End: 2024-08-02
Payer: COMMERCIAL

## 2024-08-05 ENCOUNTER — APPOINTMENT (OUTPATIENT)
Dept: PHYSICAL THERAPY | Facility: CLINIC | Age: 49
End: 2024-08-05
Payer: COMMERCIAL

## 2024-08-09 ENCOUNTER — APPOINTMENT (OUTPATIENT)
Dept: PHYSICAL THERAPY | Facility: CLINIC | Age: 49
End: 2024-08-09
Payer: COMMERCIAL

## 2024-08-12 ENCOUNTER — APPOINTMENT (OUTPATIENT)
Dept: PHYSICAL THERAPY | Facility: CLINIC | Age: 49
End: 2024-08-12
Payer: COMMERCIAL

## 2024-08-16 ENCOUNTER — APPOINTMENT (OUTPATIENT)
Dept: PHYSICAL THERAPY | Facility: CLINIC | Age: 49
End: 2024-08-16
Payer: COMMERCIAL

## 2024-09-16 ENCOUNTER — OFFICE VISIT (OUTPATIENT)
Dept: PRIMARY CARE | Facility: CLINIC | Age: 49
End: 2024-09-16
Payer: COMMERCIAL

## 2024-09-16 ENCOUNTER — PRE-ADMISSION TESTING (OUTPATIENT)
Dept: PREADMISSION TESTING | Facility: HOSPITAL | Age: 49
End: 2024-09-16
Payer: COMMERCIAL

## 2024-09-16 VITALS
TEMPERATURE: 97.2 F | SYSTOLIC BLOOD PRESSURE: 147 MMHG | HEART RATE: 83 BPM | WEIGHT: 250.4 LBS | DIASTOLIC BLOOD PRESSURE: 110 MMHG | OXYGEN SATURATION: 98 % | BODY MASS INDEX: 37.09 KG/M2 | HEIGHT: 69 IN

## 2024-09-16 VITALS
WEIGHT: 249.12 LBS | BODY MASS INDEX: 36.9 KG/M2 | OXYGEN SATURATION: 98 % | HEART RATE: 80 BPM | RESPIRATION RATE: 16 BRPM | DIASTOLIC BLOOD PRESSURE: 126 MMHG | TEMPERATURE: 95.5 F | SYSTOLIC BLOOD PRESSURE: 177 MMHG | HEIGHT: 69 IN

## 2024-09-16 DIAGNOSIS — I10 HYPERTENSION, UNSPECIFIED TYPE: ICD-10-CM

## 2024-09-16 DIAGNOSIS — I10 HYPERTENSION, UNSPECIFIED TYPE: Primary | ICD-10-CM

## 2024-09-16 DIAGNOSIS — Z01.818 PREOP TESTING: Primary | ICD-10-CM

## 2024-09-16 DIAGNOSIS — E66.01 CLASS 2 SEVERE OBESITY DUE TO EXCESS CALORIES WITH SERIOUS COMORBIDITY AND BODY MASS INDEX (BMI) OF 36.0 TO 36.9 IN ADULT: ICD-10-CM

## 2024-09-16 LAB
ANION GAP SERPL CALC-SCNC: 10 MMOL/L (ref 10–20)
BUN SERPL-MCNC: 16 MG/DL (ref 6–23)
CALCIUM SERPL-MCNC: 9.1 MG/DL (ref 8.6–10.3)
CHLORIDE SERPL-SCNC: 105 MMOL/L (ref 98–107)
CO2 SERPL-SCNC: 27 MMOL/L (ref 21–32)
CREAT SERPL-MCNC: 0.96 MG/DL (ref 0.5–1.3)
EGFRCR SERPLBLD CKD-EPI 2021: >90 ML/MIN/1.73M*2
ERYTHROCYTE [DISTWIDTH] IN BLOOD BY AUTOMATED COUNT: 13.1 % (ref 11.5–14.5)
GLUCOSE SERPL-MCNC: 88 MG/DL (ref 74–99)
HCT VFR BLD AUTO: 41.5 % (ref 41–52)
HGB BLD-MCNC: 13.7 G/DL (ref 13.5–17.5)
MCH RBC QN AUTO: 30.9 PG (ref 26–34)
MCHC RBC AUTO-ENTMCNC: 33 G/DL (ref 32–36)
MCV RBC AUTO: 94 FL (ref 80–100)
NRBC BLD-RTO: 0 /100 WBCS (ref 0–0)
PLATELET # BLD AUTO: 292 X10*3/UL (ref 150–450)
POTASSIUM SERPL-SCNC: 4.5 MMOL/L (ref 3.5–5.3)
RBC # BLD AUTO: 4.43 X10*6/UL (ref 4.5–5.9)
SODIUM SERPL-SCNC: 137 MMOL/L (ref 136–145)
WBC # BLD AUTO: 2.4 X10*3/UL (ref 4.4–11.3)

## 2024-09-16 PROCEDURE — 3077F SYST BP >= 140 MM HG: CPT | Performed by: FAMILY MEDICINE

## 2024-09-16 PROCEDURE — 99213 OFFICE O/P EST LOW 20 MIN: CPT | Performed by: FAMILY MEDICINE

## 2024-09-16 PROCEDURE — 80048 BASIC METABOLIC PNL TOTAL CA: CPT

## 2024-09-16 PROCEDURE — 3008F BODY MASS INDEX DOCD: CPT | Performed by: FAMILY MEDICINE

## 2024-09-16 PROCEDURE — 87081 CULTURE SCREEN ONLY: CPT | Mod: PARLAB

## 2024-09-16 PROCEDURE — 3080F DIAST BP >= 90 MM HG: CPT | Performed by: FAMILY MEDICINE

## 2024-09-16 PROCEDURE — 85027 COMPLETE CBC AUTOMATED: CPT

## 2024-09-16 PROCEDURE — 1036F TOBACCO NON-USER: CPT | Performed by: FAMILY MEDICINE

## 2024-09-16 PROCEDURE — 93005 ELECTROCARDIOGRAM TRACING: CPT

## 2024-09-16 PROCEDURE — 36415 COLL VENOUS BLD VENIPUNCTURE: CPT

## 2024-09-16 PROCEDURE — 93010 ELECTROCARDIOGRAM REPORT: CPT | Performed by: INTERNAL MEDICINE

## 2024-09-16 RX ORDER — VALSARTAN AND HYDROCHLOROTHIAZIDE 80; 12.5 MG/1; MG/1
1 TABLET, FILM COATED ORAL DAILY
Qty: 30 TABLET | Refills: 3 | Status: SHIPPED | OUTPATIENT
Start: 2024-09-16 | End: 2025-09-16

## 2024-09-16 RX ORDER — IBUPROFEN 400 MG/1
400 TABLET ORAL EVERY 6 HOURS PRN
COMMUNITY

## 2024-09-16 RX ORDER — HYDROGEN PEROXIDE 3 %
20 SOLUTION, NON-ORAL MISCELLANEOUS
COMMUNITY

## 2024-09-16 RX ORDER — CHLORHEXIDINE GLUCONATE 40 MG/ML
SOLUTION TOPICAL DAILY
Qty: 118 ML | Refills: 0 | Status: SHIPPED | OUTPATIENT
Start: 2024-09-16 | End: 2024-09-21

## 2024-09-16 RX ORDER — CHLORHEXIDINE GLUCONATE ORAL RINSE 1.2 MG/ML
15 SOLUTION DENTAL DAILY
Qty: 30 ML | Refills: 0 | Status: SHIPPED | OUTPATIENT
Start: 2024-09-16 | End: 2024-09-18

## 2024-09-16 ASSESSMENT — DUKE ACTIVITY SCORE INDEX (DASI)
CAN YOU PARTICIPATE IN MODERATE RECREATIONAL ACTIVITIES LIKE GOLF, BOWLING, DANCING, DOUBLES TENNIS OR THROWING A BASEBALL OR FOOTBALL: YES
DASI METS SCORE: 9
CAN YOU TAKE CARE OF YOURSELF (EAT, DRESS, BATHE, OR USE TOILET): YES
CAN YOU HAVE SEXUAL RELATIONS: YES
CAN YOU DO HEAVY WORK AROUND THE HOUSE LIKE SCRUBBING FLOORS OR LIFTING AND MOVING HEAVY FURNITURE: YES
CAN YOU WALK INDOORS, SUCH AS AROUND YOUR HOUSE: YES
CAN YOU CLIMB A FLIGHT OF STAIRS OR WALK UP A HILL: YES
CAN YOU PARTICIPATE IN STRENOUS SPORTS LIKE SWIMMING, SINGLES TENNIS, FOOTBALL, BASKETBALL, OR SKIING: NO
CAN YOU DO MODERATE WORK AROUND THE HOUSE LIKE VACUUMING, SWEEPING FLOORS OR CARRYING GROCERIES: YES
CAN YOU RUN A SHORT DISTANCE: YES
CAN YOU DO YARD WORK LIKE RAKING LEAVES, WEEDING OR PUSHING A MOWER: YES
CAN YOU DO LIGHT WORK AROUND THE HOUSE LIKE DUSTING OR WASHING DISHES: YES
TOTAL_SCORE: 50.7
CAN YOU WALK A BLOCK OR TWO ON LEVEL GROUND: YES

## 2024-09-16 NOTE — PROGRESS NOTES
Subjective   Patient ID: Leopoldo Ko is a 49 y.o. male who presents for Hypertension.  HPI    Patient presents in the office today for a follow up on hypertension. Patient states he had a pre screening for an upcoming surgery at the [place] location today and they told him his blood pressure was high and did an EKG. Patient states that they told him the EKG was normal.   Denies chest pain,SOB, swelling, headaches, lightheadedness or dizziness.   Eats a generally healthy diet, Exercises.   Does not check BP at home.   Currently  not on a blood pressure medication.      Review of Systems  Constitutional:  no chills, no fever and no night sweats.  Eyes: no blurred vision and no eyesight problems.  ENT: no hearing loss, no nasal congestion, no hoarseness and no sore throat.  Neck: no mass (es) and no swelling.  Cardiovascular: no chest pain, no intermittent leg claudication, no lower extremity edema, no palpitation and no syncope.  Respiratory: no cough, no shortness of breath during exertion, no shortness of breath at rest and no wheezing.  Gastrointestinal: no abdominal pain, no blood in stools, no constipation, no diarrhea, no melena, no nausea, no rectal pain and no vomiting.  Genitourinary: no dysuria, no change in urinary frequency, no urinary hesitancy and no feelings of urinary urgency.  Musculoskeletal: no arthralgias, no back pain and no myalgias.  Integumentary: no new skin lesions and no rashes.  Neurological: no difficulty walking, no headache, no limb weakness, no numbness and no tingling.  Psychiatric/Behavioral: no anxiety, no depression, no anhedonia and no substance use disorders.  Endocrine: no recent weight gain and no recent weight loss.  Hematologic/Lymphatic: no tendency for easy bruising and no swollen glands    Objective   Physical Exam  Patient did have right shoulder surgery PAT this morning getting blood drawn at the hospital blood pressure markedly elevated.  Have not seen him in a year  "blood pressure was normal at that point lungs clear cardiac and abdominal exams are benign no peripheral edema he is gained 30 pounds this may be the reason for his increase in blood pressure.  We had a long discussion about this we will get him started on Diovan hydrochlorothiazide follow-up with him 2 weeks after surgery and he will work on weight loss starting as he recovers from the shoulder repair.  Will check his labs also.  BP (!) 147/110 (BP Location: Right arm, Patient Position: Sitting)   Pulse 83   Temp 36.2 °C (97.2 °F) (Temporal)   Ht 1.753 m (5' 9\")   Wt 114 kg (250 lb 6.4 oz)   SpO2 98%   BMI 36.98 kg/m²     Lab Results   Component Value Date    WBC 2.4 (L) 09/16/2024    HGB 13.7 09/16/2024    HCT 41.5 09/16/2024    MCV 94 09/16/2024     09/16/2024       Assessment/Plan plan start medication routine recheck 3 weeks  Problem List Items Addressed This Visit    None  Visit Diagnoses       Hypertension, unspecified type    -  Primary    BMI 36.0-36.9,adult        Class 2 severe obesity due to excess calories with serious comorbidity and body mass index (BMI) of 36.0 to 36.9 in adult (Multi)              "

## 2024-09-16 NOTE — CPM/PAT H&P
CPM/PAT Evaluation       Name: Leopoldo Ko (Leopoldo Ko)  /Age: 1975/49 y.o.     In-Person       Chief Complaint: PAT for Right shoulder surgery    49 yr old male w/no significant Phx except for GERD, arthritis and Right shoulder labral tear referred to PAT for planned Right shoulder labral tear - Right shoulder arthroscopy rotator cuff repair, debridement, subacromial decompression w/Dr Blackwell on 2024        Patient reports feeling overall well, denies fever, cough or recent infection. During exam, patient had elevated BP, several readings taken throughout visit including a manual (200/110); patient asymptomatic, denies headache, visual disturbance, chest pain, chest pressure or palpitations.  No prior hx of HTN and is not on any HTN medications. Ecg performed showing NSR (73 bpm). Call placed to PCP office, spoke with office staff and secured an appointment today at 11:30 am; patient aware and agreeable to visit.  Patient reports remaining otherwise physically active; denies cardiac or respiratory symptoms.  Denies past issues with anesthesia.      Followed by PCP (Gerber) - last visit 2023; appointment today at 11:30        Past Medical History:   Diagnosis Date    Arthritis     GERD (gastroesophageal reflux disease)     Hypertension        Past Surgical History:   Procedure Laterality Date    LASIK  2017    MENISCECTOMY         Patient  reports being sexually active.    Family History   Problem Relation Name Age of Onset    No Known Problems Mother      No Known Problems Father         No Known Allergies    Prior to Admission medications    Medication Sig Start Date End Date Taking? Authorizing Provider   esomeprazole (NexIUM) 20 mg DR capsule Take 1 capsule (20 mg) by mouth once daily in the morning. Take before meals. Do not open capsule.   Yes Historical Provider, MD   ibuprofen 400 mg tablet Take 1 tablet (400 mg) by mouth every 6 hours if needed for moderate pain (4 - 6).    Yes Historical Provider, MD   cyclobenzaprine (Flexeril) 10 mg tablet Take 1 tablet (10 mg) by mouth every 8 hours if needed for muscle spasms for up to 10 days. 10/31/23 11/10/23  Shimon Bryant DO   predniSONE (Deltasone) 20 mg tablet 3 tabs p.o. daily x3, 2 tabs  p.o. daily x3, 1 tab p.o. daily x3, one half tab p.o. daily x4  Patient not taking: Reported on 9/16/2024 10/31/23   Shimon Bryant DO        Review of Systems    Constitutional: no fever, no chills and not feeling poorly.   Eyes: no eyesight problems.   ENT: no hearing loss, no nosebleeds and no sore throat.   Cardiovascular: no chest pain, no palpitations and no extremity edema.   Respiratory: no sob, no wheezing, no cough and no sob w/exertion.   Gastrointestinal: negative for abdominal pain, blood in stools or changes in bowel habits   Genitourinary: negative for dysuria, incontinence or changes in urinary habits   Musculoskeletal: no arthralgias, ambulates independently.   Integumentary: negative for lesions, rash or itching.   Neurological: negative for confusion, dizziness or fainting.   Psychiatric: not suicidal, no anxiety and no depression.   All other systems have been reviewed and are negative for complaint.     Physical Exam  HENT:      Head: Normocephalic.   Eyes:      Pupils: Pupils are equal, round, and reactive to light.   Cardiovascular:      Rate and Rhythm: Normal rate and regular rhythm.      Pulses: Normal pulses.   Pulmonary:      Effort: Pulmonary effort is normal.      Breath sounds: Normal breath sounds.   Abdominal:      General: Bowel sounds are normal.   Musculoskeletal:         General: Normal range of motion.      Cervical back: Normal range of motion.   Skin:     General: Skin is warm and dry.   Neurological:      Mental Status: He is alert and oriented to person, place, and time.   Psychiatric:         Mood and Affect: Mood normal.          PAT AIRWAY:   Airway:     Mallampati::  III    TM distance::  >3 FB    Neck  ROM::  Limited   implants      Visit Vitals  BP (!) 177/126   Pulse 80   Temp 35.3 °C (95.5 °F)   Resp 16       DASI Risk Score      Flowsheet Row Most Recent Value   DASI SCORE 50.7   METS Score (Will be calculated only when all the questions are answered) 9          Caprini DVT Assessment    No data to display       Modified Frailty Index    No data to display       CHADS2 Stroke Risk         N/A 3 to 100%: High Risk   2 to < 3%: Medium Risk   0 to < 2%: Low Risk     Last Change: N/A          This score determines the patient's risk of having a stroke if the patient has atrial fibrillation.        This score is not applicable to this patient. Components are not calculated.          Revised Cardiac Risk Index    No data to display       Apfel Simplified Score    No data to display       Risk Analysis Index Results This Encounter    No data found in the last 1 encounters.       Stop Bang Score      Flowsheet Row Most Recent Value   Do you snore loudly? 1   Do you often feel tired or fatigued after your sleep? 0   Has anyone ever observed you stop breathing in your sleep? 0   Do you have or are you being treated for high blood pressure? 0   Recent BMI (Calculated) 36.8   Is BMI greater than 35 kg/m2? 1=Yes   Age older than 50 years old? 0=No   Gender - Male 1=Yes            Assessment and Plan:       # HTN - elevated today, PCP office contacted, appt today at 11:30 am; patient aware and agreeable  # GERD - controlled with esomeprazole  # Right shoulder labral tear - Right shoulder arthroscopy rotator cuff repair, debridement, subacromial decompression w/Dr Blackwell on 9/26/2024    Ecg performed today - NSR (73 bpm)  Medical hx, Allergies, VS and Labs reviewed  Medications addressed w/pre-op instructions provided  Call placed to PCP office for asymptomatic HTN; spoke with office staff, patient to be seen today at 11:30 am - patient aware and agreeable.

## 2024-09-16 NOTE — PREPROCEDURE INSTRUCTIONS
Medication List            Accurate as of September 16, 2024  8:59 AM. Always use your most recent med list.                * chlorhexidine 0.12 % solution  Commonly known as: Peridex  Use 15 mL in the mouth or throat once daily for 2 doses. Swish and Spit day before surgery and again morning on day of surgery.     * chlorhexidine 4 % external liquid  Commonly known as: Hibiclens  Apply topically once daily for 5 days. Wash daily for 5 days prior to surgery with day 5 being morning of surgery.     cyclobenzaprine 10 mg tablet  Commonly known as: Flexeril  Take 1 tablet (10 mg) by mouth every 8 hours if needed for muscle spasms for up to 10 days.  Medication Adjustments for Surgery: Do Not take on the morning of surgery  Additional Medication Adjustments for Surgery: Other (Comment)     esomeprazole 20 mg DR capsule  Commonly known as: NexIUM  Medication Adjustments for Surgery: Take/Use as prescribed     ibuprofen 400 mg tablet  Additional Medication Adjustments for Surgery: Take last dose 7 days before surgery     predniSONE 20 mg tablet  Commonly known as: Deltasone  3 tabs p.o. daily x3, 2 tabs  p.o. daily x3, 1 tab p.o. daily x3, one half tab p.o. daily x4  Medication Adjustments for Surgery: Take/Use as prescribed           * This list has 2 medication(s) that are the same as other medications prescribed for you. Read the directions carefully, and ask your doctor or other care provider to review them with you.                PRE-OPERATIVE INSTRUCTIONS FOR SURGERY    *Do not eat anything after midnight the night of surgery.  This includes food of any kind (including hard candy, cough drops, mints).   You may have up to 13.5 ounces of clear liquid  until TWO hours prior to your arrival time to the hospital unless ERAS* protocol is ordered for you.  Clear liquids include water, black tea/coffee, (no milk or cream) apple juice and electrolyte drinks (GATORADE).  You may chew gum until TWO hours prior you your  surgery/procedure.     *ERAS protocol: follow as instructed.  DO not drink an additional 13.5 ounces as noted above.        *One of our staff members will call you ONE business day before your surgery, between 11 am-2 pm to let you know the time to arrive.  If you have not received a call by 2 pm, call [phone]  *When you arrive at the hospital-->GO TO Registration on the ground floor  *Stop smoking 24 hours prior to surgery.  No Marijuana, CBD Oil or Vaping for 48 hours  *No alcohol 24 hours prior to surgery  *You will need a responsible adult to drive you home  -No acrylic nails or nail polish on at least one fingernail, NO polish on toes for foot surgery  -You may be asked to remove your dentures, partial plate, eyeglasses or contact lenses before going to surgery.  Please bring a case for these items.  -Body piercings need to be removed.  Jewelry and valuables should be left at home.  -Put on loose,  comfortable, clean clothing, that will accommodate bandages        What is a home antibacterial shower?  START SHOWERS 9/22  This shower is a way of cleaning the skin with a germ killing solution before surgery.  The solution contains chlorhexidine, commonly known as CHG.  CHG is a skin cleanser with germ killing ability.  Let your doctor know if you are allergic to chlorhexidine.    Why do I need to take a preoperative antibacterial shower?  Skin is not sterile.  It is best to try to make your skin as free of germs as possible before surgery.  Proper cleansing with a germ killing soap before surgery can lower the number of germs on your skin.  This helps to reduce the risk of infection at the surgical site.  Following the instructions listed below will help you prepare your skin for surgery.      How do I use the solution?    Steps: Begin using your CHG soap 5 days before your surgery on _9/26/2024_________________.    *First, wash and rinse your hair using the CHG soap.  Keep CHG soap away from ear canals and  eyes.   Rinse completely, do not condition.  Hair extensions should be removed.    *Wash your face with your normal soap and rinse.   *Apply the CHG solution to a clean wet washcloth.  Turn the water off or move away from the water spray to avoid premature rinsing of the CHG soap as you are applying.  Firmly lather your entire body from the neck down.  Do not use on your face.    *Pay special attention to the area(s) where your incision(s) will be located unless they are on your face.  Avoid scrubbing your skin too hard.  The important part is to have the CHG soap sit on your skin for 3 minutes.   *When the 3 minutes are up, turn on the water and rinse the CHG solution off your body completely.  *Do not wash with regular soap after you  have  used the CHG soap solution.  *Pat  yourself dry with a clean, freshly laundered towel.  *Do not apply powders, deodorants or lotions.  *Dress in clean freshly laundered night clothes.    *Be sure to sleep with clean freshly laundered sheets.    *Be aware the CHG will cause stains on fabrics; if you wash them with bleach after use.  Rinse your washcloth and other linens that have contact with CHG completely.  Use only non-chlorine detergents to launder the items  used.  *The morning of surgery is the fifth day.  Repeat the above steps and dress in clean comfortable clothing.     What is oral/dental rinse?  It is mouthwash.  It is a way of cleaning the he mouth with a germ-killing solution before your surgery.  The solution contains chlorhexidine, commonly known as CHG.  It is used inside the mouth to kill a bacteria known as Staphylococcus aureus.  Let your doctor know if you are allergic to Chlorhexidine.    Why do I need to use CHG oral/ dental rinse?  The CHG oral/dental rinse helps to kill bacteria in your mouth know as Staphylococcus aureus.  This reduces the risk of infection at the surgical site.    Using your CHG oral/dental rinse    STEPS:    Use your CHG oral/dental  rinse after you brush your teeth the night before (at bedtime) and the morning of your surgery.  Follow all the directions on your prescription label.  *Use the cap on the container to measure 15 ml  *Swish (gargle if you can) the mouthwash in your mouth for at least 30 seconds, (do not swallow) and spit out  *After you use your CHG rinse, do not rinse your mouth with water, drink or eat.  Please refer to the prescription label for the appropriate time to resume oral intake.    What side effects might I have using the CHG oral/dental rinse?  CHG rinse will stick you plaque on the teeth.  Brush and floss just before use.   Teeth brushing will help avoid staining of the plaque during  use.  Teeth brushing will help avoid staining of plaque during  use.    Who should I contact if I have questions about the CHG oral/dental rinse and or CHG soap?  Please call [facility], Pre-Admission testing at [phone] if you have any questions.    What you may be asked to bring to surgery:  ___Crutches, walker  ___CPAP machine  ___Urine specimen

## 2024-09-18 LAB
ATRIAL RATE: 73 BPM
P AXIS: 51 DEGREES
P OFFSET: 195 MS
P ONSET: 134 MS
PR INTERVAL: 174 MS
Q ONSET: 221 MS
QRS COUNT: 12 BEATS
QRS DURATION: 90 MS
QT INTERVAL: 366 MS
QTC CALCULATION(BAZETT): 403 MS
QTC FREDERICIA: 391 MS
R AXIS: 1 DEGREES
STAPHYLOCOCCUS SPEC CULT: NORMAL
T AXIS: 10 DEGREES
T OFFSET: 404 MS
VENTRICULAR RATE: 73 BPM

## 2024-09-19 ENCOUNTER — TELEPHONE (OUTPATIENT)
Dept: PRIMARY CARE | Facility: CLINIC | Age: 49
End: 2024-09-19
Payer: COMMERCIAL

## 2024-09-19 DIAGNOSIS — D72.9 ABNORMAL WBC COUNT: ICD-10-CM

## 2024-09-19 NOTE — TELEPHONE ENCOUNTER
----- Message from Oskar Mayes sent at 9/18/2024 12:15 PM EDT -----  Patient is cleared for his surgery.  He needs a repeat CBC with differential in a month says his white count is still little low.

## 2024-09-26 ENCOUNTER — HOSPITAL ENCOUNTER (OUTPATIENT)
Facility: HOSPITAL | Age: 49
Setting detail: OUTPATIENT SURGERY
Discharge: HOME | End: 2024-09-26
Attending: ORTHOPAEDIC SURGERY | Admitting: ORTHOPAEDIC SURGERY
Payer: COMMERCIAL

## 2024-09-26 ENCOUNTER — ANESTHESIA EVENT (OUTPATIENT)
Dept: OPERATING ROOM | Facility: HOSPITAL | Age: 49
End: 2024-09-26
Payer: COMMERCIAL

## 2024-09-26 ENCOUNTER — ANESTHESIA (OUTPATIENT)
Dept: OPERATING ROOM | Facility: HOSPITAL | Age: 49
End: 2024-09-26
Payer: COMMERCIAL

## 2024-09-26 VITALS
BODY MASS INDEX: 36.9 KG/M2 | HEIGHT: 69 IN | RESPIRATION RATE: 16 BRPM | SYSTOLIC BLOOD PRESSURE: 130 MMHG | WEIGHT: 249.12 LBS | OXYGEN SATURATION: 94 % | TEMPERATURE: 97 F | HEART RATE: 75 BPM | DIASTOLIC BLOOD PRESSURE: 82 MMHG

## 2024-09-26 DIAGNOSIS — S43.431A LABRAL TEAR OF SHOULDER, RIGHT, INITIAL ENCOUNTER: Primary | ICD-10-CM

## 2024-09-26 PROCEDURE — 3600000004 HC OR TIME - INITIAL BASE CHARGE - PROCEDURE LEVEL FOUR: Performed by: ORTHOPAEDIC SURGERY

## 2024-09-26 PROCEDURE — 7100000010 HC PHASE TWO TIME - EACH INCREMENTAL 1 MINUTE: Performed by: ORTHOPAEDIC SURGERY

## 2024-09-26 PROCEDURE — 29826 SHO ARTHRS SRG DECOMPRESSION: CPT | Performed by: ORTHOPAEDIC SURGERY

## 2024-09-26 PROCEDURE — A29823 PR SHLDR ARTHROSCOP,EXTEN DEBRIDE: Performed by: ANESTHESIOLOGY

## 2024-09-26 PROCEDURE — 3700000002 HC GENERAL ANESTHESIA TIME - EACH INCREMENTAL 1 MINUTE: Performed by: ORTHOPAEDIC SURGERY

## 2024-09-26 PROCEDURE — 2500000004 HC RX 250 GENERAL PHARMACY W/ HCPCS (ALT 636 FOR OP/ED): Performed by: ANESTHESIOLOGY

## 2024-09-26 PROCEDURE — 2500000005 HC RX 250 GENERAL PHARMACY W/O HCPCS

## 2024-09-26 PROCEDURE — 3700000001 HC GENERAL ANESTHESIA TIME - INITIAL BASE CHARGE: Performed by: ORTHOPAEDIC SURGERY

## 2024-09-26 PROCEDURE — 2720000007 HC OR 272 NO HCPCS: Performed by: ORTHOPAEDIC SURGERY

## 2024-09-26 PROCEDURE — 7100000001 HC RECOVERY ROOM TIME - INITIAL BASE CHARGE: Performed by: ORTHOPAEDIC SURGERY

## 2024-09-26 PROCEDURE — 3600000009 HC OR TIME - EACH INCREMENTAL 1 MINUTE - PROCEDURE LEVEL FOUR: Performed by: ORTHOPAEDIC SURGERY

## 2024-09-26 PROCEDURE — 2500000005 HC RX 250 GENERAL PHARMACY W/O HCPCS: Performed by: ORTHOPAEDIC SURGERY

## 2024-09-26 PROCEDURE — 7100000009 HC PHASE TWO TIME - INITIAL BASE CHARGE: Performed by: ORTHOPAEDIC SURGERY

## 2024-09-26 PROCEDURE — A29823 PR SHLDR ARTHROSCOP,EXTEN DEBRIDE

## 2024-09-26 PROCEDURE — 64415 NJX AA&/STRD BRCH PLXS IMG: CPT | Performed by: ANESTHESIOLOGY

## 2024-09-26 PROCEDURE — 29823 SHO ARTHRS SRG XTNSV DBRDMT: CPT | Performed by: ORTHOPAEDIC SURGERY

## 2024-09-26 PROCEDURE — 2500000004 HC RX 250 GENERAL PHARMACY W/ HCPCS (ALT 636 FOR OP/ED): Mod: JZ | Performed by: ORTHOPAEDIC SURGERY

## 2024-09-26 PROCEDURE — 7100000002 HC RECOVERY ROOM TIME - EACH INCREMENTAL 1 MINUTE: Performed by: ORTHOPAEDIC SURGERY

## 2024-09-26 PROCEDURE — 2500000004 HC RX 250 GENERAL PHARMACY W/ HCPCS (ALT 636 FOR OP/ED)

## 2024-09-26 RX ORDER — MIDAZOLAM HYDROCHLORIDE 1 MG/ML
1 INJECTION, SOLUTION INTRAMUSCULAR; INTRAVENOUS ONCE AS NEEDED
Status: DISCONTINUED | OUTPATIENT
Start: 2024-09-26 | End: 2024-09-26 | Stop reason: HOSPADM

## 2024-09-26 RX ORDER — SODIUM CHLORIDE 0.9 G/100ML
IRRIGANT IRRIGATION AS NEEDED
Status: DISCONTINUED | OUTPATIENT
Start: 2024-09-26 | End: 2024-09-26 | Stop reason: HOSPADM

## 2024-09-26 RX ORDER — ONDANSETRON 4 MG/1
4 TABLET, FILM COATED ORAL EVERY 8 HOURS PRN
Qty: 10 TABLET | Refills: 0 | Status: SHIPPED | OUTPATIENT
Start: 2024-09-26 | End: 2024-10-03

## 2024-09-26 RX ORDER — PHENYLEPHRINE HCL IN 0.9% NACL 1 MG/10 ML
SYRINGE (ML) INTRAVENOUS AS NEEDED
Status: DISCONTINUED | OUTPATIENT
Start: 2024-09-26 | End: 2024-09-26

## 2024-09-26 RX ORDER — MIDAZOLAM HYDROCHLORIDE 1 MG/ML
INJECTION, SOLUTION INTRAMUSCULAR; INTRAVENOUS AS NEEDED
Status: DISCONTINUED | OUTPATIENT
Start: 2024-09-26 | End: 2024-09-26

## 2024-09-26 RX ORDER — PROPOFOL 10 MG/ML
INJECTION, EMULSION INTRAVENOUS AS NEEDED
Status: DISCONTINUED | OUTPATIENT
Start: 2024-09-26 | End: 2024-09-26

## 2024-09-26 RX ORDER — ACETAMINOPHEN 325 MG/1
650 TABLET ORAL EVERY 4 HOURS PRN
Status: DISCONTINUED | OUTPATIENT
Start: 2024-09-26 | End: 2024-09-26 | Stop reason: HOSPADM

## 2024-09-26 RX ORDER — CEFAZOLIN SODIUM 2 G/100ML
2 INJECTION, SOLUTION INTRAVENOUS ONCE
Status: COMPLETED | OUTPATIENT
Start: 2024-09-26 | End: 2024-09-26

## 2024-09-26 RX ORDER — ONDANSETRON HYDROCHLORIDE 2 MG/ML
4 INJECTION, SOLUTION INTRAVENOUS ONCE AS NEEDED
Status: DISCONTINUED | OUTPATIENT
Start: 2024-09-26 | End: 2024-09-26 | Stop reason: HOSPADM

## 2024-09-26 RX ORDER — OXYCODONE HYDROCHLORIDE 5 MG/1
5 TABLET ORAL EVERY 4 HOURS PRN
Qty: 20 TABLET | Refills: 0 | Status: SHIPPED | OUTPATIENT
Start: 2024-09-26 | End: 2024-10-03

## 2024-09-26 RX ORDER — ROCURONIUM BROMIDE 10 MG/ML
INJECTION, SOLUTION INTRAVENOUS AS NEEDED
Status: DISCONTINUED | OUTPATIENT
Start: 2024-09-26 | End: 2024-09-26

## 2024-09-26 RX ORDER — ONDANSETRON HYDROCHLORIDE 2 MG/ML
INJECTION, SOLUTION INTRAVENOUS AS NEEDED
Status: DISCONTINUED | OUTPATIENT
Start: 2024-09-26 | End: 2024-09-26

## 2024-09-26 RX ORDER — HYDRALAZINE HYDROCHLORIDE 20 MG/ML
5 INJECTION INTRAMUSCULAR; INTRAVENOUS EVERY 30 MIN PRN
Status: DISCONTINUED | OUTPATIENT
Start: 2024-09-26 | End: 2024-09-26 | Stop reason: HOSPADM

## 2024-09-26 RX ORDER — SODIUM CHLORIDE, SODIUM LACTATE, POTASSIUM CHLORIDE, CALCIUM CHLORIDE 600; 310; 30; 20 MG/100ML; MG/100ML; MG/100ML; MG/100ML
100 INJECTION, SOLUTION INTRAVENOUS CONTINUOUS
Status: DISCONTINUED | OUTPATIENT
Start: 2024-09-26 | End: 2024-09-26 | Stop reason: HOSPADM

## 2024-09-26 RX ORDER — MEPERIDINE HYDROCHLORIDE 25 MG/ML
12.5 INJECTION INTRAMUSCULAR; INTRAVENOUS; SUBCUTANEOUS EVERY 10 MIN PRN
Status: DISCONTINUED | OUTPATIENT
Start: 2024-09-26 | End: 2024-09-26 | Stop reason: HOSPADM

## 2024-09-26 RX ORDER — FENTANYL CITRATE 50 UG/ML
INJECTION, SOLUTION INTRAMUSCULAR; INTRAVENOUS
Status: COMPLETED
Start: 2024-09-26 | End: 2024-09-26

## 2024-09-26 RX ORDER — HYDROMORPHONE HYDROCHLORIDE 1 MG/ML
1 INJECTION, SOLUTION INTRAMUSCULAR; INTRAVENOUS; SUBCUTANEOUS EVERY 5 MIN PRN
Status: DISCONTINUED | OUTPATIENT
Start: 2024-09-26 | End: 2024-09-26 | Stop reason: HOSPADM

## 2024-09-26 RX ORDER — FENTANYL CITRATE 50 UG/ML
INJECTION, SOLUTION INTRAMUSCULAR; INTRAVENOUS AS NEEDED
Status: DISCONTINUED | OUTPATIENT
Start: 2024-09-26 | End: 2024-09-26

## 2024-09-26 RX ORDER — LABETALOL HYDROCHLORIDE 5 MG/ML
5 INJECTION, SOLUTION INTRAVENOUS ONCE AS NEEDED
Status: DISCONTINUED | OUTPATIENT
Start: 2024-09-26 | End: 2024-09-26 | Stop reason: HOSPADM

## 2024-09-26 RX ORDER — MIDAZOLAM HYDROCHLORIDE 1 MG/ML
INJECTION, SOLUTION INTRAMUSCULAR; INTRAVENOUS
Status: COMPLETED
Start: 2024-09-26 | End: 2024-09-26

## 2024-09-26 RX ORDER — DIPHENHYDRAMINE HYDROCHLORIDE 50 MG/ML
12.5 INJECTION INTRAMUSCULAR; INTRAVENOUS ONCE AS NEEDED
Status: DISCONTINUED | OUTPATIENT
Start: 2024-09-26 | End: 2024-09-26 | Stop reason: HOSPADM

## 2024-09-26 RX ORDER — LIDOCAINE HCL/PF 100 MG/5ML
SYRINGE (ML) INTRAVENOUS AS NEEDED
Status: DISCONTINUED | OUTPATIENT
Start: 2024-09-26 | End: 2024-09-26

## 2024-09-26 SDOH — HEALTH STABILITY: MENTAL HEALTH: CURRENT SMOKER: 0

## 2024-09-26 ASSESSMENT — PAIN - FUNCTIONAL ASSESSMENT
PAIN_FUNCTIONAL_ASSESSMENT: 0-10

## 2024-09-26 ASSESSMENT — PAIN SCALES - GENERAL
PAINLEVEL_OUTOF10: 0 - NO PAIN
PAINLEVEL_OUTOF10: 0 - NO PAIN
PAIN_LEVEL: 0
PAINLEVEL_OUTOF10: 2
PAINLEVEL_OUTOF10: 2
PAINLEVEL_OUTOF10: 0 - NO PAIN
PAINLEVEL_OUTOF10: 0 - NO PAIN
PAINLEVEL_OUTOF10: 2
PAINLEVEL_OUTOF10: 0 - NO PAIN
PAINLEVEL_OUTOF10: 0 - NO PAIN
PAINLEVEL_OUTOF10: 2

## 2024-09-26 ASSESSMENT — PAIN DESCRIPTION - DESCRIPTORS
DESCRIPTORS: BURNING

## 2024-09-26 NOTE — SIGNIFICANT EVENT
Discharge instructions reviewed and provided to patient. Patient denies any questions related to material reviewed.

## 2024-09-26 NOTE — ANESTHESIA POSTPROCEDURE EVALUATION
Patient: Leopoldo Ko    Procedure Summary       Date: 09/26/24 Room / Location: [facility] OR 07 / [facility]    Anesthesia Start: 1209 Anesthesia Stop:     Procedures:       RIGHT SHOULDER ARTHROSCOPY (Right: Shoulder)      EXTENSIVE DEDBRIDEMENT (Right: Shoulder)      SUBACROMIAL DECOMPRESSION (Right: Shoulder) Diagnosis:       Labral tear of shoulder, right, initial encounter      (Labral tear of shoulder, right, initial encounter [S43.431A])    Surgeons: Giovanny Blackwell MD Responsible Provider: Mauri Fitzgerald MD    Anesthesia Type: general, regional ASA Status: 2            Anesthesia Type: general, regional    Vitals Value Taken Time   /81 09/26/24 1328   Temp 36 °C (96.8 °F) 09/26/24 1328   Pulse 83 09/26/24 1328   Resp 16 09/26/24 1328   SpO2 100 % 09/26/24 1328       Anesthesia Post Evaluation    Patient location during evaluation: PACU  Patient participation: complete - patient participated  Level of consciousness: awake and alert  Pain score: 0  Pain management: adequate  Airway patency: patent  Cardiovascular status: acceptable  Respiratory status: acceptable  Hydration status: acceptable  Postoperative Nausea and Vomiting: none    No notable events documented.

## 2024-09-26 NOTE — OP NOTE
RIGHT SHOULDER ARTHROSCOPY (R), EXTENSIVE DEDBRIDEMENT (R), SUBACROMIAL DECOMPRESSION (R) Operative Note     Date: 2024  OR Location: [facility]    Name: Leopoldo Ko, : 1975, Age: 49 y.o., MRN: [id number], Sex: male    Diagnosis  Pre-op Diagnosis      * Labral tear of shoulder, right, initial encounter [S43.431A] Post-op Diagnosis     * Labral tear of shoulder, right, initial encounter [S43.431A], rotator cuff tear, chondromalacia, impingement     Procedures    Right shoulder arthroscopy with extensive debridement and subacromial decompression    Surgeons      * Giovanny Blackwell - Primary    Resident/Fellow/Other Assistant:    Ismael Alves.  Amelia Norris    Procedure Summary  Anesthesia: Regional, General  ASA: II  Anesthesia Staff: Anesthesiologist: Mauri Fitzgerald MD  CRNA: LINDA Baxter-CRNA  Estimated Blood Loss: 2 mL    Indications: 49-year-old with right shoulder pain that has not responded to conservative treatment.  Treatment options including no treatment were reviewed and the decision was made to proceed with surgery.    Description of procedure: Patient was brought in the operating room after scalene block was performed.  General anesthesia was performed.  He was positioned in the lateral decubitus position and prepped and draped in usual fashion.  The joint was injected with saline and a posterior arthroscopy portal was established.  Arthroscopic examination the joint was performed.  There was partial articular sided tearing involving the anterior part of the supraspinatus tendon.  Bicep tendon had a normal appearance.  Subscapularis tendon was intact.  There was tearing involving the superior labrum anteriorly and posteriorly.  There was grade II chondromalacia along the superior glenoid.  There was grade II chondromalacia along the posterior superior humeral head.  The anterior-inferior and posterior inferior labrum were intact.  There were no loose bodies in the inferior  pouch.  An anterior portal was established.  Motorized shaver was introduced.  Motorized shaver was used to debride the supraspinatus tendon to a healthy appearing tissue.  This was approximately a 25% partial articular sided tear.  The motorized shaver was used to perform debridement of the labrum.  Moderate shaver was used to perform chondroplasty of the glenoid.  Moderate shaver was used perform chondroplasty along the humeral head.  The area of the partial articular sided tear was marked with a spinal needle and the arthroscope was placed in the subacromial space.  There was extensive subacromial bursitis.  A lateral portal was established.  Using combination of the radiofrequency wand and the motorized shaver subacromial bursectomy was performed.  The rotator cuff was visualized from the bursal side and was intact.  The CA ligament was released and then using the motorized bur acromioplasty was performed until there was adequate decompression of the subacromial space.  There was a anterior impinging osteophyte.  There was a significant osteophyte along the inferior aspect the distal clavicle and this was removed using the motorized bur.  The shoulder was well irrigated.  Hemostasis obtained.  The instruments removed and portals closed with nylon suture.  Sterile dressing and a sling were applied he tolerated the procedure well and was taken recovery in stable condition.             Anesthesia Record               Intraprocedure I/O Totals       None           Specimen: No specimens collected     Staff:   Scrub Person: Leigh  Circulator: [place]  Circulator: Lyly      Attending Attestation: I performed the procedure.    Giovanny Blackwell  Phone Number: [phone]

## 2024-09-26 NOTE — ANESTHESIA PROCEDURE NOTES
Peripheral Block    Patient location during procedure: pre-op  Start time: 9/26/2024 11:15 AM  End time: 9/26/2024 11:25 AM  Reason for block: procedure for pain, at surgeon's request and post-op pain management  Staffing  Performed: attending   Authorized by: Mauri Fitzgerald MD    Performed by: Mauri Fitzgerald MD  Preanesthetic Checklist  Completed: patient identified, IV checked, site marked, risks and benefits discussed, surgical consent, monitors and equipment checked, pre-op evaluation and timeout performed   Timeout performed at: 9/26/2024 11:15 AM  Peripheral Block  Patient position: sitting  Prep: ChloraPrep  Patient monitoring: heart rate, cardiac monitor and continuous pulse ox  Block type: interscalene  Laterality: right  Injection technique: single-shot  Guidance: nerve stimulator and ultrasound guided  Infiltration strength: 0.5 %  Dose: 20 mL  Needle  Needle type: short-bevel   Needle gauge: 21 G  Needle length: 10 cm  Needle localization: nerve stimulator and ultrasound guidance  Test dose: negative  Assessment  Injection assessment: negative aspiration for heme, no paresthesia on injection, incremental injection and local visualized surrounding nerve on ultrasound  Paresthesia pain: none  Heart rate change: no  Slow fractionated injection: yes  Additional Notes  0.5% Ropivcaine 20 ml with Epi wash and 20mg Depomedrol injected without any problems.

## 2024-09-26 NOTE — ANESTHESIA PROCEDURE NOTES
Airway  Date/Time: 9/26/2024 12:17 PM  Urgency: elective    Airway not difficult    Staffing  Performed: attending   Authorized by: Mauri Fitzgerald MD    Performed by: LINDA Baxter-CRNA  Patient location during procedure: OR    Indications and Patient Condition  Indications for airway management: anesthesia  Spontaneous Ventilation: absent  Sedation level: deep  Preoxygenated: yes  Patient position: sniffing  Mask difficulty assessment: 1 - vent by mask  Planned trial extubation    Final Airway Details  Final airway type: endotracheal airway      Successful airway: ETT  Cuffed: yes   Successful intubation technique: video laryngoscopy (Vang 4)  Facilitating devices/methods: intubating stylet  Endotracheal tube insertion site: oral  Blade: Guille  Blade size: #4  ETT size (mm): 8.0  Cormack-Lehane Classification: grade I - full view of glottis  Placement verified by: chest auscultation, capnometry and palpation of cuff   Cuff volume (mL): 8  Measured from: lips  ETT to lips (cm): 23  Number of attempts at approach: 1  Ventilation between attempts: none  Number of other approaches attempted: 0    Additional Comments  Elective Vang 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 MD supervised masking/intubation with student. Atraumatic intubation attempt x1.

## 2024-09-27 ASSESSMENT — PAIN SCALES - GENERAL: PAINLEVEL_OUTOF10: 0 - NO PAIN

## 2024-10-03 NOTE — PROGRESS NOTES
Physical Therapy    Physical Therapy Evaluation    Patient Name: Leopoldo Ko  MRN: [id number]  Today's Date: 10/4/2024  Time Calculation  Start Time: 0830  Stop Time: 0910  Time Calculation (min): 40 min     Problem List Items Addressed This Visit    None  Visit Diagnoses         Codes    Rotator cuff tendinitis, right     M75.81    Relevant Orders    Follow Up In Physical Therapy            Insurance:  Visit number: 1 of 60V PCY 0 USED NO AUTH NEEDED PAYS %   Insurance Type: Payor: [facility] / Plan: [facility] / Product Type: *No Product type* /     General:  Reason for visit: DOS: 9/26/24: Right shoulder arthroscopy with extensive debridement and subacromial decompression    Referred by: Dr Giovanny Machado MD appt:  10/8/24     Precautions:  Per SAD protocol.  Fall Risk: None     Assessment:   Leopoldo Ko is postop right SAD and debridemen.  Date of surgery: 9/26/2024   MD restrictions are as follows: see post op SAD protocol.     Clinical Presentation: Stable and/or uncomplicated characteristics    Impairments include:  PROM  AROM  Strength  Knowledge of precautions  HEP    Functional deficits include:  Reaching  Lifting  Sleeping  Driving    Recommended Treatment:   Education about the condition, activity modification, pain management with ice, heat, or medications(as recommended by MD), posture education, correction of dysfunctional movement patterns, stretching, and strengthening exercises. Therapeutic exercise, Manual therapy, Home program instruction and progression, Neuromuscular re-education, Therapeutic activities, Self care and home management, Instruction in activity modification, Electrical stimulation, Vasopneumatic device + cold, and Cryotherapy    Plan:  Plan of care was developed with input and agreement by the patient.  0-2 x per week for 12 weeks.    Rehab Potential: Excellent to achieve goals.    Goals:  Short term goals:   -Patient will demo  correct posture with min to no cueing to allow for correct loading strategy.    -Restore full shoulder AROM    -Patient to demonstrate correct performance of HEP for symptom management    -Pain< 4/10    Long term goals:   -QuickDash= 19 % disability  to indicate a significant improvement in overall function.     -Patient will demonstrate 4/5 rotator cuff strength (all planes) to allow for correct reach/lift mechanics.    -Patient will demo mild to no limitation AROM of the R shoulder to allow for correct mechanics with functional mobility.     -Patient will report reaching overhead without pain to allow for return to work and ADLs without limitation.    -Patient will report driving without pain to allow for return to work and ADLs without limitation.     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        Subjective:  CC:   RHD; 9/26/24: Right shoulder arthroscopy with extensive debridement and subacromial decompression  due to chronic R shoulder pain.  This was an [employer] issue that never got resolved.  Chronic shoulder issues 20 years ago.  BONI:  NKI.  DOI:   PAIN - Location: R shoulder Worst(past 24 hours): 6  Least(past 24 hours): 3  Pain Quality: aching, sharp, spasm, and tightness  PAIN - Alleviating: acetaminophen, ice, and sling   Sling PRN.  Aggravating: AROM R UE, lifting, and reaching  MEDICAL MANAGEMENT: Referred to Physical Therapy, MRI, Orthopedic specialist, and Physical therapy in the past  PLOF:  Chronic shoulder pain.   Clicking popping.  FUNCTIONAL LIMITATIONS:  Was not able to lift heavy weights with bench pressing, pull downs.    WORK:  IT  EXERCISE:  Trys to exercise.  Cross fit type exercise routine.  Patient Stated Goal: return to sport and workout and lift.  Have a normal shoulder.    Medical History Form: Reviewed (scanned into chart)    Objective:       - POSTURE: Posture: forward head shoulders rounded forward scapular protraction Increased thoracic  "kyphotic    - INCISION: healing well    - SHOULDER ROM:   Shoulder ROM: Right shoulder AROM: .  Flexion: 80  External Rotation in 0 Abd: 0  Right shoulder PROM: .  Flexion: 80  External rotation in 0 Abd: 0    - MUSCLE STRENGTH:  Not tested.    SENSATION:   Patient denies altered sensation in the UE extremities.  Dermatomes of the Upper Extremity are intact to light touch.    - Elbow Lacking 10 degrees to full flexion, he has full ext.  - forearm and hand are WNL      Outcome Measures:  Other Measures  Disability of Arm Shoulder Hand (DASH): 96     Treatment Performed: (\"NP\" = Not Performed)     Therapeutic Exercise:     25 minutes  Home exercise program instructed and issued.  Access Code: [email or web address]    Standing 'L' Stretch at Counter  10 reps  Standing Shoulder External Rotation AAROM with Dowel  10 reps - 3 second hold  Flexion-Extension Shoulder Pendulum with Table Support   20-30 reps  Horizontal Shoulder Pendulum with Table Support  20-30 reps  Seated Scapular Retraction 10 reps - 5 second hold  Seated Elbow Flexion and Extension AROM   2 sets - 10 reps  Seated Forearm Pronation and Supination AROM  2 sets - 10 reps  Supine Shoulder Flexion AAROM with Hands Clasped 2 sets - 10 reps  Next session - Consider Manual therapy and Vaso post tx.    Manual Therapy:       minutes      Neuromuscular Re-education:      minutes      Gait Training:            minutes      Aquatics:            minutes      Therapeutic Activity:      minutes      Modalities:       Vasopneumatic Device       minutes  Electrical Stimulation          minutes  Ultrasound            minutes  Iontophoresis                     minutes  Cold Pack            minutes  Mechanical Traction           minutes    Self Care Home Management:    minutes    Canalith Reposition:          minutes     Education:          minutes    Other:       minutes      Evaluation Complexity: Low: 15 minutes; Moderate   minutes; Complex PT Evaluation Time Entry: 15;  " minutes    Re-Evaluation:   minutes

## 2024-10-04 ENCOUNTER — EVALUATION (OUTPATIENT)
Dept: PHYSICAL THERAPY | Facility: CLINIC | Age: 49
End: 2024-10-04
Payer: COMMERCIAL

## 2024-10-04 DIAGNOSIS — M75.81 ROTATOR CUFF TENDINITIS, RIGHT: ICD-10-CM

## 2024-10-04 PROCEDURE — 97161 PT EVAL LOW COMPLEX 20 MIN: CPT | Mod: GP | Performed by: PHYSICAL THERAPIST

## 2024-10-04 PROCEDURE — 97110 THERAPEUTIC EXERCISES: CPT | Mod: GP | Performed by: PHYSICAL THERAPIST

## 2024-10-04 ASSESSMENT — PATIENT HEALTH QUESTIONNAIRE - PHQ9
SUM OF ALL RESPONSES TO PHQ9 QUESTIONS 1 AND 2: 0
2. FEELING DOWN, DEPRESSED OR HOPELESS: NOT AT ALL
1. LITTLE INTEREST OR PLEASURE IN DOING THINGS: NOT AT ALL

## 2024-10-07 ENCOUNTER — TREATMENT (OUTPATIENT)
Dept: PHYSICAL THERAPY | Facility: CLINIC | Age: 49
End: 2024-10-07
Payer: COMMERCIAL

## 2024-10-07 DIAGNOSIS — M75.81 ROTATOR CUFF TENDINITIS, RIGHT: ICD-10-CM

## 2024-10-07 PROCEDURE — 97110 THERAPEUTIC EXERCISES: CPT | Mod: GP | Performed by: PHYSICAL THERAPIST

## 2024-10-07 PROCEDURE — 97140 MANUAL THERAPY 1/> REGIONS: CPT | Mod: GP | Performed by: PHYSICAL THERAPIST

## 2024-10-07 NOTE — PROGRESS NOTES
PHYSICAL THERAPY TREATMENT NOTE    Patient Name:  Leopoldo Ko   Patient MRN: [id number]  Date: 10/7/2024  Time Calculation  Start Time: 1127  Stop Time: 1210  Time Calculation (min): 43 min      Problem List Items Addressed This Visit    None  Visit Diagnoses         Codes    Rotator cuff tendinitis, right     M75.81          Insurance:  Visit number: 1 of 60V PCY 0 USED NO AUTH NEEDED PAYS %   Insurance Type: Payor: [facility] / Plan: [facility] / Product Type: *No Product type* /     General:  Reason for visit: DOS: 9/26/24: Right shoulder arthroscopy with extensive debridement and subacromial decompression    Referred by: Dr Giovanny Machado MD appt:  10/8/24     Precautions:  Per SAD protocol.  Fall Risk: None    Subjective:   Leopoldo reports he feels like his condition is improving. Patient reports  Improved ROM.   Reduced frequency of pain. Reduced intensity of pain.   Pain (0-10): 5    HEP adherence / understanding: partial compliance with the instructed home exercises.    Assessment:   11 days postop SAD,   Education: Reviewed home exercise program. Provided verbal feedback to improve exercise technique. Discussed significance of applying ice to reduce pain. Discussed significance of performing his HEP   Continue to apply ice for pain control  Progress towards functional goals: Improved reaching ability. Reduced intensity of pain. Reduced pain level.  Response to interventions: Patient tolerated therapeutic interventions well and without any adverse events.  Justification for continued skilled care: To address remaining functional, objective and subjective deficits to allow the patient to return to full independence with ADLs. Skilled intervention required to improve ROM which will improve function. Reduce pain to improve  "function.    Plan:  Exercises targeting surrounding musculature to strengthen the R shoulder and improve function. Manual therapy to improve joint mobility. Exercises to gradually increase flexibility and range of motion of the R shoulder.    Objective:  10/7/24: PROM flexion 120 ER in 45 Abd = 45     Treatment Performed: (\"NP\" = Not Performed)     Therapeutic Exercise:     33 minutes  Access Code: [email or web address]  Pulleys facing towards and facing away  Flexion-Extension Shoulder Pendulum with Table Support   20-30 reps  Horizontal Shoulder Pendulum with Table Support  20-30 reps  Standing 'L' Stretch at Counter  10 reps  Standing Shoulder External Rotation AAROM with Dowel  3 x 10 second hold  Wand extension x 10   Wand flexion x 10   Submax isometrics: ER/IR/Flex/Ext: 5\" x 10   Consider wall slides next session:   **ACTIVITIES BELOW WERE NOT PERFORMED**     Seated Scapular Retraction 10 reps - 5 second hold  Seated Elbow Flexion and Extension AROM   2 sets - 10 reps  Seated Forearm Pronation and Supination AROM  2 sets - 10 reps  Supine Shoulder Flexion AAROM with Hands Clasped 2 sets - 10 reps  Next session - Consider Manual therapy and Vaso post tx.    Manual Therapy:     10 minutes  Supine PROM flex/Abd/ER in multiple angles.    Neuromuscular Re-education:      minutes      Gait Training:            minutes      Aquatics:            minutes      Therapeutic Activity:      minutes      Gait Training:            minutes      Modalities:       Vasopneumatic Device       minutes  Electrical Stimulation          minutes  Ultrasound            minutes  Iontophoresis                     minutes  Cold Pack            minutes  Mechanical Traction           minutes    Self Care Home Management:    minutes    Canalith Reposition:          minutes     Education:          minutes    Other:       minutes      Evaluation Complexity: Low:   minutes; Moderate   minutes; Complex   minutes    Re-Evaluation:   minutes           "

## 2024-10-08 ENCOUNTER — APPOINTMENT (OUTPATIENT)
Dept: ORTHOPEDIC SURGERY | Facility: CLINIC | Age: 49
End: 2024-10-08
Payer: COMMERCIAL

## 2024-10-08 VITALS — HEIGHT: 69 IN | WEIGHT: 235 LBS | BODY MASS INDEX: 34.8 KG/M2

## 2024-10-08 DIAGNOSIS — Z98.890 STATUS POST ARTHROSCOPY OF RIGHT SHOULDER: ICD-10-CM

## 2024-10-08 PROCEDURE — 3008F BODY MASS INDEX DOCD: CPT | Performed by: ORTHOPAEDIC SURGERY

## 2024-10-08 PROCEDURE — 99211 OFF/OP EST MAY X REQ PHY/QHP: CPT | Performed by: ORTHOPAEDIC SURGERY

## 2024-10-08 PROCEDURE — 1036F TOBACCO NON-USER: CPT | Performed by: ORTHOPAEDIC SURGERY

## 2024-10-08 NOTE — PROGRESS NOTES
Seen today following shoulder arthroscopy with extensive debridement and subacromial decompression on September 26, 2024. Doing better and having less pain.    The portals are healed without evidence of infection.    The sutures were removed and Steri-Strips applied. The arthroscopic pictures and postoperative precautions were reviewed. Physical therapy will be started. Follow up in 8 weeks.

## 2024-10-10 ENCOUNTER — TREATMENT (OUTPATIENT)
Dept: PHYSICAL THERAPY | Facility: CLINIC | Age: 49
End: 2024-10-10
Payer: COMMERCIAL

## 2024-10-10 DIAGNOSIS — M75.81 ROTATOR CUFF TENDINITIS, RIGHT: ICD-10-CM

## 2024-10-10 PROCEDURE — 97140 MANUAL THERAPY 1/> REGIONS: CPT | Mod: GP,CQ

## 2024-10-10 PROCEDURE — 97110 THERAPEUTIC EXERCISES: CPT | Mod: GP,CQ

## 2024-10-10 NOTE — PROGRESS NOTES
PHYSICAL THERAPY TREATMENT NOTE    Patient Name:  Leopoldo Ko   Patient MRN: [id number]  Date: 10/10/2024  Time Calculation  Start Time: 1123  Stop Time: 1203  Time Calculation (min): 40 min      Problem List Items Addressed This Visit             ICD-10-CM    Rotator cuff tendinitis, right M75.81     Insurance:  Visit number: 1 of 60V PCY 0 USED NO AUTH NEEDED PAYS %   Insurance Type: Payor: [facility] / Plan: [facility] / Product Type: *No Product type* /     General:  Reason for visit: DOS: 9/26/24: Right shoulder arthroscopy with extensive debridement and subacromial decompression    Referred by: Dr Giovanny Machado MD appt:  10/8/24     Precautions:  Per SAD protocol.  Fall Risk: None    Subjective:   Leopoldo reports he feels like his condition is improving. Patient reports  Improved ROM.   Reduced frequency of pain. Reduced intensity of pain.Continues to use ice at home. HEP 2x /day   Pain (0-10): 4-5 R shoulder    HEP adherence / understanding: HEP 2x /day    Assessment:   14 days postop SAD,   Education: Reviewed home exercise program. Provided verbal feedback to improve exercise technique. Discussed significance of applying ice to reduce pain. Discussed significance of performing his scapular retractions  and supine hands clasped as upon review of HEP he has not been doing them, also updated HEP with wall slides.   Continue to apply ice for pain control  Progress towards functional goals: Improved reaching ability. Reduced intensity of pain. Reduced pain level.Improved ROM  Response to interventions: Patient tolerated therapeutic interventions well and without any adverse events.  Justification for continued skilled care: To address remaining functional, objective and subjective deficits to allow the patient to return to full independence with ADLs.  "Skilled intervention required to improve ROM which will improve function. Reduce pain to improve function.    Plan:  Exercises targeting surrounding musculature to strengthen the R shoulder and improve function. Manual therapy to improve joint mobility. Exercises to gradually increase flexibility and range of motion of the R shoulder.    Objective:  10/7/24: PROM flexion 140   ER in 45 Abd =50     Treatment Performed: (\"NP\" = Not Performed)     Therapeutic Exercise:     30 minutes  Access Code: [email or web address]  Pulleys facing towards and facing away  Flexion-Extension Shoulder Pendulum with Table Support   20-30 reps  Horizontal Shoulder Pendulum with Table Support  20-30 reps  Standing 'L' Stretch at Counter  10 reps NP  Standing Shoulder External Rotation AAROM with Dowel  3 x 10 second hold  Wand extension 2 x 10   Wand flexion x 10   Submax isometrics: Flex/Ext: 5\" x 10   YTband walk outs ER/IR 10x   Towel flexion wall slides  5 sec 2 x 10 issued to HEP  Supine Shoulder Flexion AAROM with Hands Clasped 2 sets - 10 reps    **ACTIVITIES BELOW WERE NOT PERFORMED**     Seated Scapular Retraction 10 reps - 5 second hold  Seated Elbow Flexion and Extension AROM   2 sets - 10 reps  Seated Forearm Pronation and Supination AROM  2 sets - 10 reps      Manual Therapy:     10 minutes  Supine PROM flex/Abd/ER in multiple angles.    Neuromuscular Re-education:      minutes        Modalities:       Vasopneumatic Device       minutes  Electrical Stimulation          minutes  Ultrasound            minutes  Iontophoresis                     minutes  Cold Pack            minutes  Mechanical Traction           minutes               "

## 2024-10-11 ENCOUNTER — APPOINTMENT (OUTPATIENT)
Dept: ORTHOPEDIC SURGERY | Facility: CLINIC | Age: 49
End: 2024-10-11
Payer: COMMERCIAL

## 2024-10-11 ENCOUNTER — APPOINTMENT (OUTPATIENT)
Dept: PHYSICAL THERAPY | Facility: CLINIC | Age: 49
End: 2024-10-11
Payer: COMMERCIAL

## 2024-10-14 ENCOUNTER — TREATMENT (OUTPATIENT)
Dept: PHYSICAL THERAPY | Facility: CLINIC | Age: 49
End: 2024-10-14
Payer: COMMERCIAL

## 2024-10-14 DIAGNOSIS — M75.81 ROTATOR CUFF TENDINITIS, RIGHT: Primary | ICD-10-CM

## 2024-10-14 PROCEDURE — 97140 MANUAL THERAPY 1/> REGIONS: CPT | Mod: GP | Performed by: PHYSICAL THERAPIST

## 2024-10-14 PROCEDURE — 97110 THERAPEUTIC EXERCISES: CPT | Mod: GP | Performed by: PHYSICAL THERAPIST

## 2024-10-14 NOTE — PROGRESS NOTES
PHYSICAL THERAPY TREATMENT NOTE    Patient Name:  Leopoldo Ko   Patient MRN: [id number]  Date: 10/14/2024  Time Calculation  Start Time: 0958  Stop Time: 1038  Time Calculation (min): 40 min      Problem List Items Addressed This Visit             ICD-10-CM    Rotator cuff tendinitis, right - Primary M75.81       Insurance:  Visit number: 1 of 60V PCY 0 USED NO AUTH NEEDED PAYS %   Insurance Type: Payor: [facility] / Plan: [facility] / Product Type: *No Product type* /     General:  Reason for visit: DOS: 9/26/24: Right shoulder arthroscopy with extensive debridement and subacromial decompression    Referred by: Dr Giovanny Machado MD appt:  10/8/24     Precautions:  Per SAD protocol.  10/24 = 4 weeks.  11/21 = 8 weeks.  12/19: 12 weeks.   Fall Risk: None    Subjective:   Leopoldo reports he feels like his condition is improving. Patient reports Improved reaching ability. Improved ability to complete household activities. Reduced frequency of pain.   Pain (0-10): 3    HEP adherence / understanding: compliance with the instructed home exercises.    Assessment:   Education: Home exercise program instructed and issued.  Progress towards functional goals: Patient reports there has not been a significant change in functional abilities.  Response to interventions: Patient tolerated therapeutic interventions well and without any adverse events.  Justification for continued skilled care: To address remaining functional, objective and subjective deficits to allow the patient to return to full independence with ADLs.    Plan:  Exercises targeting surrounding musculature to strengthen and improve function. Manual therapy to improve joint mobility. Exercises to gradually increase flexibility and range of motion of the R shoulder.    Objective:    R shoulder.  10/14/24:  "AROM Flexion: 115, ER in 0 abd = 4, IR = thumb to PSIS; Supine PROM flexion 152  10/7/24: PROM flexion 140   ER in 45 Abd =50     Treatment Performed: (\"NP\" = Not Performed)     Therapeutic Exercise:     32 minutes  Access Code: [email or web address]  Pulleys facing away 2 minutes  Flexion-Extension Shoulder Pendulum with Table Support   20-30 reps  Horizontal Shoulder Pendulum with Table Support  20-30 reps  Standing 'L' Stretch at Counter  10 reps NP  Wand ER x 2 minutes  Supine ER in 45 Abd  x 1 minute  Supine ER in 90 Abd 1 minute  S/l ER AROM x 30   LAE: Red 2 x 15   Row: Red 2 x 15   Supine OH flexion x 10   Belt IR stretch 3\" x 10     **ACTIVITIES BELOW WERE NOT PERFORMED**   Wand extension 2 x 10   Wand flexion x 10   YTband walk outs ER/IR 10x   Towel flexion wall slides  5 sec 2 x 10 issued to HEP  Supine Shoulder Flexion AAROM with Hands Clasped 2 sets - 10 reps  Seated Scapular Retraction 10 reps - 5 second hold  Seated Elbow Flexion and Extension AROM   2 sets - 10 reps  Seated Forearm Pronation and Supination AROM  2 sets - 10 reps      Manual Therapy:     8 minutes  Supine PROM flex/Abd/ER in multiple angles.  Distraction/oscillation    Neuromuscular Re-education:      minutes        Modalities:       Vasopneumatic Device       minutes  Electrical Stimulation          minutes  Ultrasound            minutes  Iontophoresis                     minutes  Cold Pack            minutes  Mechanical Traction           minutes               "

## 2024-10-17 ENCOUNTER — OFFICE VISIT (OUTPATIENT)
Dept: PRIMARY CARE | Facility: CLINIC | Age: 49
End: 2024-10-17
Payer: COMMERCIAL

## 2024-10-17 ENCOUNTER — TREATMENT (OUTPATIENT)
Dept: PHYSICAL THERAPY | Facility: CLINIC | Age: 49
End: 2024-10-17
Payer: COMMERCIAL

## 2024-10-17 VITALS
HEIGHT: 69 IN | HEART RATE: 82 BPM | TEMPERATURE: 97.5 F | DIASTOLIC BLOOD PRESSURE: 90 MMHG | WEIGHT: 250 LBS | SYSTOLIC BLOOD PRESSURE: 124 MMHG | BODY MASS INDEX: 37.03 KG/M2 | OXYGEN SATURATION: 98 %

## 2024-10-17 DIAGNOSIS — R63.5 WEIGHT GAIN: ICD-10-CM

## 2024-10-17 DIAGNOSIS — M75.81 ROTATOR CUFF TENDINITIS, RIGHT: ICD-10-CM

## 2024-10-17 DIAGNOSIS — M25.511 ACUTE PAIN OF RIGHT SHOULDER: Primary | ICD-10-CM

## 2024-10-17 DIAGNOSIS — E78.2 MIXED HYPERLIPIDEMIA: ICD-10-CM

## 2024-10-17 DIAGNOSIS — R53.81 MALAISE AND FATIGUE: ICD-10-CM

## 2024-10-17 DIAGNOSIS — N40.0 BENIGN PROSTATIC HYPERPLASIA WITHOUT LOWER URINARY TRACT SYMPTOMS: ICD-10-CM

## 2024-10-17 DIAGNOSIS — I10 HYPERTENSION, UNSPECIFIED TYPE: ICD-10-CM

## 2024-10-17 DIAGNOSIS — R53.83 MALAISE AND FATIGUE: ICD-10-CM

## 2024-10-17 PROCEDURE — 3074F SYST BP LT 130 MM HG: CPT | Performed by: FAMILY MEDICINE

## 2024-10-17 PROCEDURE — 99213 OFFICE O/P EST LOW 20 MIN: CPT | Performed by: FAMILY MEDICINE

## 2024-10-17 PROCEDURE — 3080F DIAST BP >= 90 MM HG: CPT | Performed by: FAMILY MEDICINE

## 2024-10-17 PROCEDURE — 3008F BODY MASS INDEX DOCD: CPT | Performed by: FAMILY MEDICINE

## 2024-10-17 PROCEDURE — 97110 THERAPEUTIC EXERCISES: CPT | Mod: GP | Performed by: PHYSICAL THERAPIST

## 2024-10-17 RX ORDER — PHENTERMINE HYDROCHLORIDE 37.5 MG/1
37.5 CAPSULE ORAL
Qty: 30 CAPSULE | Refills: 0 | Status: SHIPPED | OUTPATIENT
Start: 2024-10-17 | End: 2024-11-16

## 2024-10-17 RX ORDER — OXYCODONE AND ACETAMINOPHEN 5; 325 MG/1; MG/1
1 TABLET ORAL EVERY 8 HOURS PRN
Qty: 21 TABLET | Refills: 0 | Status: SHIPPED | OUTPATIENT
Start: 2024-10-17 | End: 2024-10-24

## 2024-10-17 ASSESSMENT — PATIENT HEALTH QUESTIONNAIRE - PHQ9
1. LITTLE INTEREST OR PLEASURE IN DOING THINGS: NOT AT ALL
SUM OF ALL RESPONSES TO PHQ9 QUESTIONS 1 AND 2: 0
2. FEELING DOWN, DEPRESSED OR HOPELESS: NOT AT ALL

## 2024-10-17 NOTE — PROGRESS NOTES
Subjective   Patient ID: Leopoldo Ko is a 49 y.o. male who presents for No chief complaint on file..  [facility]  Hospital follow up. JUVE complete. Admitted to [facility]. Admission dates 9/26/2024. Admitted for Right shoulder arthroscopy with extensive debridement and subacromial decompression . Days since discharge 3 weeks. Patient had shoulder surgery. Patient advised to follow up with pcp. Patient was prescribed n/a.    Would like to discuss weight management, blood pressure and requesting a referral for a sleep study   Review of Systems  Constitutional:  no chills, no fever and no night sweats.  Eyes: no blurred vision and no eyesight problems.  ENT: no hearing loss, no nasal congestion, no hoarseness and no sore throat.  Neck: no mass (es) and no swelling.  Cardiovascular: no chest pain, no intermittent leg claudication, no lower extremity edema, no palpitation and no syncope.  Respiratory: no cough, no shortness of breath during exertion, no shortness of breath at rest and no wheezing.  Gastrointestinal: no abdominal pain, no blood in stools, no constipation, no diarrhea, no melena, no nausea, no rectal pain and no vomiting.  Genitourinary: no dysuria, no change in urinary frequency, no urinary hesitancy and no feelings of urinary urgency.  Musculoskeletal: no arthralgias, no back pain and no myalgias.  Integumentary: no new skin lesions and no rashes.  Neurological: no difficulty walking, no headache, no limb weakness, no numbness and no tingling.  Psychiatric/Behavioral: no anxiety, no depression, no anhedonia and no substance use disorders.  Endocrine: no recent weight gain and no recent weight loss.  Hematologic/Lymphatic: no tendency for easy bruising and no swollen glands    Objective   Physical Exam  Patient in follow-up right shoulder surgery he had left shoulder cleaned out and decompressed still having significant pain doing the therapy orthopedics wanted us to write something for his  pain medication overall he is improved but still has to do the therapy and that makes the pain worse.  Right shoulder pain slowly improving.  Also has gained a significant amount of weight we need to get his thyroid and his cholesterol and PSA checked other labs were normal as they were done before surgery.  Will try him on Adipex daily and follow-up in a month to see how he is doing if he has any problems in the meantime he will let us know.  There were no vitals taken for this visit.    Lab Results   Component Value Date    WBC 2.4 (L) 09/16/2024    HGB 13.7 09/16/2024    HCT 41.5 09/16/2024    MCV 94 09/16/2024     09/16/2024       Assessment/Plan   Problem List Items Addressed This Visit    None

## 2024-10-17 NOTE — PROGRESS NOTES
PHYSICAL THERAPY TREATMENT NOTE    Patient Name:  Leopoldo Ko   Patient MRN: [id number]  Date: 10/17/2024  Time Calculation  Start Time: 1000  Stop Time: 1040  Time Calculation (min): 40 min      Problem List Items Addressed This Visit             ICD-10-CM    Rotator cuff tendinitis, right M75.81       Insurance:  Visit number: 1 of 60V PCY 0 USED NO AUTH NEEDED PAYS %   Insurance Type: Payor: [facility] / Plan: [facility] / Product Type: *No Product type* /     General:  Reason for visit: DOS: 9/26/24: Right shoulder arthroscopy with extensive debridement and subacromial decompression    Referred by: Dr Giovanny Machado MD appt:  10/8/24     Precautions:  Per SAD protocol.  10/24 = 4 weeks.  11/21 = 8 weeks.  12/19: 12 weeks.   Fall Risk: None    Subjective:   Leopoldo reports he feels like his condition is improving.    Pain (0-10): 2    HEP adherence / understanding: compliance with the instructed home exercises.    Assessment:   Education: Reviewed home exercise program.  Progress towards functional goals: Improved lifting ability. Improved reaching ability. Improved ability to complete household activities. Reduced frequency of pain.  Response to interventions: Patient tolerated therapeutic interventions well and without any adverse events. Improved joint mobility.  Improved independence with home exercises. Patient was appropriately fatigued with no complaints. Improved tolerance to strengthening progressions.  Justification for continued skilled care: To address remaining functional, objective and subjective deficits to allow the patient to return to full independence with ADLs.    Plan:  Exercises targeting surrounding musculature to strengthen the R shoulder and improve function. Manual therapy to improve joint mobility.    Objective:        R  "shoulder.  10/17/24: PROM flexion: 155, ER in 90 Abd = 85      Treatment Performed: (\"NP\" = Not Performed)     Therapeutic Exercise:     35 minutes  Access Code: [email or web address]  Pulleys facing away 2 minutes  Flexion-Extension Shoulder Pendulum with Table Support   20-30 reps  LAE: Red 2 x 15   Row: Red 2 x 15   IR: Red: 2 x 15  Bicep Curl: Blue Band 2 x 15  Ball roll up wall: x 10   Prone T and V (palm down/thumb up) x 10 ea  Wall pushup 2 x 15  Ball on wall: up/down, side to side x 20 seconds ea    [facility] suggest adding: rhythmic stabilizations with band in ER, and 90 flexion; Also sleeper stretch for IR.  **ACTIVITIES BELOW WERE NOT PERFORMED**   Standing 'L' Stretch at Counter  10 reps NP  Wand ER x 2 minutes  Supine ER in 45 Abd  x 1 min  Supine ER in 90 Abd 1 minute  S/l ER AROM x 30  Supine OH flexion x 10   Belt IR stretch 3\" x 10           Manual Therapy:     5 minutes  Supine PROM flex/Abd/ER in multiple angles.  Distraction/oscillation    Neuromuscular Re-education:      minutes        Modalities:       Vasopneumatic Device       minutes  Electrical Stimulation          minutes  Ultrasound            minutes  Iontophoresis                     minutes  Cold Pack            minutes  Mechanical Traction           minutes               "

## 2024-10-22 ENCOUNTER — TREATMENT (OUTPATIENT)
Dept: PHYSICAL THERAPY | Facility: CLINIC | Age: 49
End: 2024-10-22
Payer: COMMERCIAL

## 2024-10-22 DIAGNOSIS — M75.81 ROTATOR CUFF TENDINITIS, RIGHT: ICD-10-CM

## 2024-10-22 PROCEDURE — 97110 THERAPEUTIC EXERCISES: CPT | Mod: GP,CQ

## 2024-10-22 PROCEDURE — 97140 MANUAL THERAPY 1/> REGIONS: CPT | Mod: GP,CQ

## 2024-10-22 NOTE — PROGRESS NOTES
"                                                                                                                         PHYSICAL THERAPY TREATMENT NOTE    Patient Name:  Leopoldo Ko   Patient MRN: [id number]  Date: 10/22/2024  Time Calculation  Start Time: 0955  Stop Time: 1035  Time Calculation (min): 40 min      Problem List Items Addressed This Visit             ICD-10-CM    Rotator cuff tendinitis, right M75.81       Insurance:  Visit number: 6 of 60V PCY 0 USED NO AUTH NEEDED PAYS %   Insurance Type: Payor: [facility] / Plan: [facility] / Product Type: *No Product type* /     General:  Reason for visit: DOS: 9/26/24: Right shoulder arthroscopy with extensive debridement and subacromial decompression    Referred by: Dr Giovanny Machado MD appt:  12/6 /24     Precautions:  Per SAD protocol.  10/24 = 4 weeks.  11/21 = 8 weeks.  12/19: 12 weeks.   Fall Risk: None    Subjective:   Patient reports: sore, may also be from the way he is sleeping   Pain (0-10):5   Location: R shoulder   Type of pain: aching   HEP adherence / understanding: compliance with the instructed home exercises.    Assessment:   Education: Reviewed home exercise program.Updated HEP , issued RTB.  Progress towards functional goals:  Able to do more at home  Response to interventions: Patient tolerated therapeutic interventions well and without any adverse events. Patient was appropriately fatigued with no complaints.  Justification for continued skilled care: To address remaining functional, objective and subjective deficits to allow the patient to return to full independence with ADLs.    Plan:  Exercises targeting surrounding musculature to strengthen R shoulder  and improve function. Manual therapy to improve joint mobility.    Objective:    R shoulder.  10/17/24: PROM flexion: 155, ER in 90 Abd = 85  10/22/24 PROM flex  160      Treatment Performed: (\"NP\" = Not Performed)     Therapeutic Exercise:     " "30 minutes  Access Code: [email or web address]  Pulleys facing away 2 minutes  Flexion-Extension Shoulder Pendulum with Table Support   20-30 reps  LAE: Red 2 x 15   Row: Red 2 x 15   IR: Red: 2 x 15  Bicep Curl: Blue Band 2 x 15  Ball  Yellow roll up wall: x 20   Prone T and V (palm down/thumb up) x 10 ea  Wall pushup 2 x 15  Ball on wall: up/down, side to side x 30 seconds ea  Belt IR stretch 3\" x 10  Sleeper stretch 15 sec 4x  also educated in importance of position  rhythmic stabilizations with  R tband in ER 15 sec 4x      **ACTIVITIES BELOW WERE NOT PERFORMED**   Standing 'L' Stretch at Counter  10 reps NP  Wand ER x 2 minutes  Supine ER in 45 Abd  x 1 min  Supine ER in 90 Abd 1 minute  S/l ER AROM x 30  Supine OH flexion x 10           Manual Therapy:     10 minutes  Supine PROM flex/Abd/ER in multiple angles.  Distraction/oscillation    Neuromuscular Re-education:      minutes        Modalities:       Vasopneumatic Device       minutes  Electrical Stimulation          minutes  Ultrasound            minutes  Iontophoresis                     minutes  Cold Pack            minutes  Mechanical Traction           minutes               "

## 2024-10-24 ENCOUNTER — DOCUMENTATION (OUTPATIENT)
Dept: PHYSICAL THERAPY | Facility: CLINIC | Age: 49
End: 2024-10-24
Payer: COMMERCIAL

## 2024-10-24 ENCOUNTER — APPOINTMENT (OUTPATIENT)
Dept: PHYSICAL THERAPY | Facility: CLINIC | Age: 49
End: 2024-10-24
Payer: COMMERCIAL

## 2024-10-24 NOTE — PROGRESS NOTES
Physical Therapy                 Therapy Communication Note    Patient Name: Leopoldo Ko  MRN: [id number]  Department: [address]  Room: Room/bed info not found  Today's Date: 10/24/2024     Discipline: Physical Therapy    Missed Visit Reason:  via my chart    Missed Time: Cancel    Comment:

## 2024-10-28 ENCOUNTER — CLINICAL SUPPORT (OUTPATIENT)
Dept: SLEEP MEDICINE | Facility: HOSPITAL | Age: 49
End: 2024-10-28
Payer: COMMERCIAL

## 2024-10-28 ENCOUNTER — TREATMENT (OUTPATIENT)
Dept: PHYSICAL THERAPY | Facility: CLINIC | Age: 49
End: 2024-10-28
Payer: COMMERCIAL

## 2024-10-28 DIAGNOSIS — R06.83 SNORING: ICD-10-CM

## 2024-10-28 DIAGNOSIS — G47.14 HYPERSOMNIA DUE TO MEDICAL CONDITION: ICD-10-CM

## 2024-10-28 DIAGNOSIS — R53.81 MALAISE AND FATIGUE: ICD-10-CM

## 2024-10-28 DIAGNOSIS — M75.81 ROTATOR CUFF TENDINITIS, RIGHT: Primary | ICD-10-CM

## 2024-10-28 DIAGNOSIS — R53.83 MALAISE AND FATIGUE: ICD-10-CM

## 2024-10-28 PROCEDURE — 97110 THERAPEUTIC EXERCISES: CPT | Mod: GP | Performed by: PHYSICAL THERAPIST

## 2024-10-28 PROCEDURE — 95806 SLEEP STUDY UNATT&RESP EFFT: CPT | Performed by: SPECIALIST

## 2024-10-28 PROCEDURE — 97140 MANUAL THERAPY 1/> REGIONS: CPT | Mod: GP | Performed by: PHYSICAL THERAPIST

## 2024-10-30 ENCOUNTER — TELEPHONE (OUTPATIENT)
Dept: PRIMARY CARE | Facility: CLINIC | Age: 49
End: 2024-10-30
Payer: COMMERCIAL

## 2024-10-30 DIAGNOSIS — G47.9 SLEEP DISTURBANCE: Primary | ICD-10-CM

## 2024-10-31 ENCOUNTER — TREATMENT (OUTPATIENT)
Dept: PHYSICAL THERAPY | Facility: CLINIC | Age: 49
End: 2024-10-31
Payer: COMMERCIAL

## 2024-10-31 DIAGNOSIS — M75.81 ROTATOR CUFF TENDINITIS, RIGHT: ICD-10-CM

## 2024-10-31 PROCEDURE — 97140 MANUAL THERAPY 1/> REGIONS: CPT | Mod: GP,CQ

## 2024-10-31 PROCEDURE — 97110 THERAPEUTIC EXERCISES: CPT | Mod: GP,CQ

## 2024-11-05 ENCOUNTER — DOCUMENTATION (OUTPATIENT)
Dept: PHYSICAL THERAPY | Facility: CLINIC | Age: 49
End: 2024-11-05
Payer: COMMERCIAL

## 2024-11-05 NOTE — PROGRESS NOTES
Physical Therapy                 Therapy Communication Note    Patient Name: Leopoldo Ko  MRN: [id number]  Department:   Room: Room/bed info not found  Today's Date: 11/5/2024     Discipline: Physical Therapy    Missed Visit Reason:  death in the family , forgot appt    Missed Time: No Show    Comment: called pt. And he states he will be at [facility]

## 2024-11-07 ENCOUNTER — TREATMENT (OUTPATIENT)
Dept: PHYSICAL THERAPY | Facility: CLINIC | Age: 49
End: 2024-11-07
Payer: COMMERCIAL

## 2024-11-07 DIAGNOSIS — M75.81 ROTATOR CUFF TENDINITIS, RIGHT: ICD-10-CM

## 2024-11-07 PROCEDURE — 97140 MANUAL THERAPY 1/> REGIONS: CPT | Mod: GP,CQ

## 2024-11-07 PROCEDURE — 97110 THERAPEUTIC EXERCISES: CPT | Mod: GP,CQ

## 2024-11-07 NOTE — PROGRESS NOTES
PHYSICAL THERAPY TREATMENT NOTE    Patient Name:  Leopoldo Ko   Patient MRN: [id number]  Date: 2024  Time Calculation  Start Time: 1205  Stop Time: 1250  Time Calculation (min): 45 min      Problem List Items Addressed This Visit             ICD-10-CM    Rotator cuff tendinitis, right M75.81     Insurance:  Visit number: 9 of 60V PCY 0 USED NO AUTH NEEDED PAYS %   Insurance Type: Payor: [facility] / Plan: [facility] / Product Type: *No Product type* /     General:  Reason for visit: DOS: 24: Right shoulder arthroscopy with extensive debridement and subacromial decompression    Referred by: Dr Giovanny Machado MD appt:       Precautions:  Per SAD protocol.  10/24 = 4 weeks.   = 8 weeks.  : 12 weeks.   Fall Risk: None    Subjective:    Doing better, has been using his arm more for things like taking out the kitchen trash, other than that no lifting. Self care with his arm is going well . Was involved with family  so has not been doing his HEP.   Pain (0-10): 0   HEP adherence / understanding:partial compliance with the instructed home exercises.    Assessment:   Education: Reviewed home exercise program. Feels he has a good solid program at this point and does not need more exercises.  Progress towards functional goals: Self care good/ using arm for light activity at home.  Response to interventions: Patient tolerated therapeutic interventions well and without any adverse events.  Justification for continued skilled care: To address remaining functional, objective and subjective deficits to allow the patient to return to full independence with ADLs.    Plan:  Exercises targeting surrounding musculature to strengthen the R shoulder and improve function. Manual therapy to improve joint mobility. Exercises to gradually increase  "flexibility and range of motion of the R shoulder.    Objective:   R shoulder.  10/28/24 AROM(after stretching flex 155 IR PSIS; PROM: Flexion 160, ER in 90 Abd 85.   11/7/24 AROM shoulder flexion 155      Treatment Performed: (\"NP\" = Not Performed)     Therapeutic Exercise:     35 minutes  Access Code: [email or web address]  Pulleys facing away 3 minutes - Emphasize aggressive stretching within limits of his pain  Pulleys IR stretch 2 minutes  Arm Bike: L5 3/3  Horiz Add stretch 30\" x 3   Flexion-Extension Shoulder Pendulum with Table Support 20-30 reps  Sleeper stretch 15 sec 4x  HEP  Band Press: Blue 3 x 10   Drop and catch red 1000 gr  small ball 3 x 10   Bent over horiz Abd 1# 3 x 10   Cable LAE: 3 x 10 15#   OH ball dribble 30\" x 3  Cable ROW 3 x 10 15#  OH rhythmic stabilizations: Yellow 2 x 30 seconds  Resisted ER: Blue TB 3 x10  Sidely ER 2 x 10   Supine AROM flex 2 x 10  Wall flexion stretch with towel 10 sec 5x     **ACTIVITIES BELOW WERE NOT PERFORMED**   Standing 'L' Stretch at Counter  10 reps NP  Wand ER x 2 minutes  Supine ER in 45 Abd  x 1 min  Supine ER in 90 Abd 1 minute  S/l ER AROM x 30  Supine OH flexion x 10           Manual Therapy:     10 minutes  Supine PROM flex/Abd/ER in multiple angles.  Distraction/oscillation    Neuromuscular Re-education:      minutes        Modalities:       Vasopneumatic Device       minutes  Electrical Stimulation          minutes  Ultrasound            minutes  Iontophoresis                     minutes  Cold Pack            minutes  Mechanical Traction           minutes               "

## 2024-11-11 ENCOUNTER — APPOINTMENT (OUTPATIENT)
Dept: PHYSICAL THERAPY | Facility: CLINIC | Age: 49
End: 2024-11-11
Payer: COMMERCIAL

## 2024-11-12 ENCOUNTER — TREATMENT (OUTPATIENT)
Dept: PHYSICAL THERAPY | Facility: CLINIC | Age: 49
End: 2024-11-12
Payer: COMMERCIAL

## 2024-11-12 DIAGNOSIS — M75.81 ROTATOR CUFF TENDINITIS, RIGHT: Primary | ICD-10-CM

## 2024-11-12 PROCEDURE — 97110 THERAPEUTIC EXERCISES: CPT | Mod: GP | Performed by: PHYSICAL THERAPIST

## 2024-11-12 PROCEDURE — 97140 MANUAL THERAPY 1/> REGIONS: CPT | Mod: GP | Performed by: PHYSICAL THERAPIST

## 2024-11-12 NOTE — PROGRESS NOTES
PHYSICAL THERAPY TREATMENT NOTE    Patient Name:  Leopoldo Ko   Patient MRN: [id number]  Date: 2024  Time Calculation  Start Time: 915  Stop Time: 955  Time Calculation (min): 40 min      Problem List Items Addressed This Visit             ICD-10-CM    Rotator cuff tendinitis, right - Primary M75.81     Insurance:  Visit number: 10 of 60V PCY 0 USED NO AUTH NEEDED PAYS %   Insurance Type: Payor: [facility] / Plan: [facility] / Product Type: *No Product type*     General:  Reason for visit: DOS: 24: Right shoulder arthroscopy with extensive debridement and subacromial decompression    Referred by: Dr Giovanny Machado MD appt:       Precautions:  Per SAD protocol.  10/24 = 4 weeks.   = 8 weeks.  : 12 weeks.   Fall Risk: None    Subjective:    Doing better, has been using his arm more for things like taking out the kitchen trash, other than that no lifting. Self care with his arm is going well . Was involved with family  so has not been doing his HEP.   Pain (0-10): 0   HEP adherence / understanding:partial compliance with the instructed home exercises.    Subjective:   Leopoldo reports he feels like his condition is improving. Patient reports Improved ability to sleep thru the night. Improved lifting ability. Improved reaching ability.   Pain (0-10): 0    HEP adherence / understanding: compliance with the instructed home exercises.    Assessment:   Education: Reviewed home exercise program. Reiterated importance of completing HEP to regain normal function.  Progress towards functional goals:  Continues to demonstrate shoulder hiking/compensation with shoulder AROM.  He can flex/abd to 90 degrees without compensation  Response to interventions: Patient tolerated therapeutic interventions well and without any adverse  "events.  Justification for continued skilled care: To address remaining functional, objective and subjective deficits to allow the patient to return to full independence with ADLs.    Plan:  Exercises targeting surrounding musculature to strengthen and improve function. Manual therapy to improve joint mobility.  Needs IR /post capsule joint mobility.    Objective:   R shoulder.  10/28/24 AROM(after stretching flex 155 IR PSIS; PROM: Flexion 160, ER in 90 Abd 85.   11/7/24 AROM shoulder flexion 155      Treatment Performed: (\"NP\" = Not Performed)     Therapeutic Exercise:     30 minutes  Access Code: [email or web address]  Arm Bike: L5 2/2  Pulleys facing away 3 minutes - Emphasize aggressive stretching within limits of his pain  Pulleys IR stretch 2 minutes  Horiz Add stretch 10\" x 5   Standing OH Press: 2# 3 x 10   Bicep Curl: 5# 3 x 10   Standing Abduction/Scaption/Flexion:  2# 2 x 10   OH ball dribble 30\" x 3  Sleeper stretch 10 sec 5x  Standing OH ABC with Yellow band resistance: x 1   Bent over LAE 15# x 10      **ACTIVITIES BELOW WERE NOT PERFORMED**   Band Press: Blue 3 x 10   Drop and catch red 1000 gr  small ball 3 x 10   Bent over horiz Abd 1# 3 x 10   Cable LAE: 3 x 10 15#     Cable ROW 3 x 10 15#  OH rhythmic stabilizations: Yellow 2 x 30 seconds  Resisted ER: Blue TB 3 x10  Sidely ER 2 x 10   Supine AROM flex 2 x 10  Wall flexion stretch with towel 10 sec 5x           Manual Therapy:     10 minutes  Supine PROM flex/Abd/ER in multiple angles.  Distraction/oscillation.  TPR to the lower trap and infraspinatus, and UT.    Neuromuscular Re-education:      minutes        Modalities:       Vasopneumatic Device       minutes  Electrical Stimulation          minutes  Ultrasound            minutes  Iontophoresis                     minutes  Cold Pack            minutes  Mechanical Traction           minutes               "

## 2024-11-14 ENCOUNTER — TREATMENT (OUTPATIENT)
Dept: PHYSICAL THERAPY | Facility: CLINIC | Age: 49
End: 2024-11-14
Payer: COMMERCIAL

## 2024-11-14 DIAGNOSIS — M75.81 ROTATOR CUFF TENDINITIS, RIGHT: Primary | ICD-10-CM

## 2024-11-14 PROCEDURE — 97110 THERAPEUTIC EXERCISES: CPT | Mod: GP | Performed by: PHYSICAL THERAPIST

## 2024-11-14 PROCEDURE — 97140 MANUAL THERAPY 1/> REGIONS: CPT | Mod: GP | Performed by: PHYSICAL THERAPIST

## 2024-11-14 NOTE — PROGRESS NOTES
PHYSICAL THERAPY TREATMENT NOTE    Patient Name:  Leopoldo Ko   Patient MRN: [id number]  Date: 2024  Time Calculation  Start Time: 1000  Stop Time: 1040  Time Calculation (min): 40 min      Problem List Items Addressed This Visit             ICD-10-CM    Rotator cuff tendinitis, right - Primary M75.81       Insurance:  Visit number: 11 of 60V PCY 0 USED NO AUTH NEEDED PAYS %   Insurance Type: Payor: [facility] / Plan: [facility] / Product Type: *No Product type*     General:  Reason for visit: DOS: 24: Right shoulder arthroscopy with extensive debridement and subacromial decompression    Referred by: Dr Giovanny Machado MD appt:       Precautions:  Per SAD protocol.  10/24 = 4 weeks.   = 8 weeks.  : 12 weeks.   Fall Risk: None    Subjective:    Doing better, has been using his arm more for things like taking out the kitchen trash, other than that no lifting. Self care with his arm is going well . Was involved with family  so has not been doing his HEP.   Pain (0-10): 0   HEP adherence / understanding:partial compliance with the instructed home exercises.    Subjective:   Leopoldo reports he feels like his condition is improving. Patient reports Improved ability to sleep thru the night. Improved lifting ability. Improved reaching ability.   Pain (0-10): 1    HEP adherence / understanding: compliance with the instructed home exercises.    Assessment:   Education: Reviewed home exercise program.  7 weeks post op.   Progress towards functional goals:  Making excellent progress towards goals.  ROM Flexion and ER are WNL.  He has slight limitation with IR, but continues to improve.  Pain is minimal.  Strength is progressing very well.    Response to interventions: Patient tolerated therapeutic interventions well and without any  "adverse events. Patient was appropriately fatigued with no complaints.  Justification for continued skilled care: To address remaining functional, objective and subjective deficits to allow the patient to return to full independence with ADLs.    Plan:  Exercises targeting surrounding musculature to strenghten and improve function. Manual therapy to improve joint mobility. Continued education on techniques such as ice, compression and elevation to manage pain and swelling.    Objective:     R shoulder.  10/28/24 AROM(after stretching flex 155 IR PSIS; PROM: Flexion 160, ER in 90 Abd 85.   11/7/24 AROM shoulder flexion 155      Treatment Performed: (\"NP\" = Not Performed)     Therapeutic Exercise:     32 minutes  Access Code: [email or web address]  Arm Bike: L5 2/2  Bicep Curl:6# x 10, 7# x 10, 8# x 10   Unilateral CC Pulldown 15# 3 x 10   Unilateral CC Row 15#, 20#, 25#   3 x 10 ea UE  Pulleys facing away 3 minutes - Emphasize aggressive stretching within limits of his pain  Pulleys IR stretch 2 minutes  Horiz Add stretch 10\" x 5   Standing OH Press: 2# x 10, 3# 2 x 10   Chest Press with alternating UEs: Black band 3 x 10 ea UE  Drop and catch: Green ball: 3 x 10 ea UE  Standing Abduction/Scaption/Flexion:  3# x 10    Standing OH ABC with Yellow band resistance: x 1   OH ball dribble 30\" x 3  Sleeper stretch 10 sec 5x    Manual Therapy:     8 minutes  Supine PROM flex/Abd/ER in multiple angles.  Distraction/oscillation.  TPR to the lower trap and infraspinatus, and UT.    Neuromuscular Re-education:      minutes        Modalities:       Vasopneumatic Device       minutes  Electrical Stimulation          minutes  Ultrasound            minutes  Iontophoresis                     minutes  Cold Pack            minutes  Mechanical Traction           minutes               "

## 2024-11-18 DIAGNOSIS — R63.5 WEIGHT GAIN: ICD-10-CM

## 2024-11-18 RX ORDER — PHENTERMINE HYDROCHLORIDE 37.5 MG/1
37.5 CAPSULE ORAL
Qty: 30 CAPSULE | Refills: 0 | Status: SHIPPED | OUTPATIENT
Start: 2024-11-18 | End: 2024-12-18

## 2024-11-18 NOTE — TELEPHONE ENCOUNTER
MEDICATION PENDED  Rx Refill Request Telephone Encounter    Name:  Leopoldo Ko  : 1975     Medication Name:  phentermine 37.5 mg capsule [[id number]]      Order Details  Dose: 37.5 mg Route: oral Frequency: Daily before breakfast   Dispense Quantity: 30 capsule Refills: 0          Sig: Take 1 capsule (37.5 mg) by mouth once daily in the morning. Take before meals.       ALLERGIES:   nka    Specific Pharmacy location:  [facility]/pharmacy #[id number] [address]     Date of last appointment:  10/17/24  Date of next appointment:  24    Best number to reach patient:  [phone]

## 2024-11-19 NOTE — PROGRESS NOTES
Subjective   Patient ID: Leopoldo Ko is a 49 y.o. male who presents for Weight Management.  HPI    Weight loss management Follow up   Taking Adipex 37.5 mg   Following up for month # 2  Has been eating a generally healthy diet  Exercises 2-3 x per week  What type of exercise physical therapy and walking  Patient last in office weight 250 lbs  Today's in office weight 240 lbs   Patient is - 10 lbs    Co morbidities include- Hypertension      Review of Systems  Constitutional:  no chills, no fever and no night sweats.  Eyes: no blurred vision and no eyesight problems.  ENT: no hearing loss, no nasal congestion, no hoarseness and no sore throat.  Neck: no mass (es) and no swelling.  Cardiovascular: no chest pain, no intermittent leg claudication, no lower extremity edema, no palpitation and no syncope.  Respiratory: no cough, no shortness of breath during exertion, no shortness of breath at rest and no wheezing.  Gastrointestinal: no abdominal pain, no blood in stools, no constipation, no diarrhea, no melena, no nausea, no rectal pain and no vomiting.  Genitourinary: no dysuria, no change in urinary frequency, no urinary hesitancy and no feelings of urinary urgency.  Musculoskeletal: no arthralgias, no back pain and no myalgias.  Integumentary: no new skin lesions and no rashes.  Neurological: no difficulty walking, no headache, no limb weakness, no numbness and no tingling.  Psychiatric/Behavioral: no anxiety, no depression, no anhedonia and no substance use disorders.  Endocrine: no recent weight gain and no recent weight loss.  Hematologic/Lymphatic: no tendency for easy bruising and no swollen glands    Objective     Physical Exam  Patient in for follow-up obesity weight management on Adipex down 10 pounds since last visit doing well denies chest pain shortness of breath sleeping well.  Well-developed well-nourished obese male in no acute distress lungs clear cardiac and abdominal exams benign.  /80    "Resp 18   Ht 1.753 m (5' 9\")   Wt 109 kg (240 lb)   BMI 35.44 kg/m²     Lab Results   Component Value Date    WBC 2.4 (L) 09/16/2024    HGB 13.7 09/16/2024    HCT 41.5 09/16/2024    MCV 94 09/16/2024     09/16/2024       Assessment/Plan plan is to continue Adipex routine recheck 3 months.  Patient will benefit from Phentermine Therapy, given the following:    Advised to take Adipex 37.5 mg in the morning 1-2 hours after breakfast  [place] reviewed today    Does  the patient demonstrate dedication to weight loss treatment plan? yes    Benefits, risks, possible adverse effects to the medication discussed today            Problem List Items Addressed This Visit    None  Visit Diagnoses       Class 2 severe obesity due to excess calories with serious comorbidity and body mass index (BMI) of 36.0 to 36.9 in adult    -  Primary    Hypertension, unspecified type        Malaise and fatigue        Mixed hyperlipidemia              "

## 2024-11-20 ENCOUNTER — APPOINTMENT (OUTPATIENT)
Dept: SLEEP MEDICINE | Facility: CLINIC | Age: 49
End: 2024-11-20
Payer: COMMERCIAL

## 2024-11-20 VITALS
SYSTOLIC BLOOD PRESSURE: 114 MMHG | OXYGEN SATURATION: 95 % | HEIGHT: 69 IN | HEART RATE: 11 BPM | DIASTOLIC BLOOD PRESSURE: 80 MMHG | WEIGHT: 238 LBS | BODY MASS INDEX: 35.25 KG/M2

## 2024-11-20 DIAGNOSIS — G47.00 INSOMNIA, UNSPECIFIED TYPE: ICD-10-CM

## 2024-11-20 DIAGNOSIS — G47.30 SLEEP APNEA, UNSPECIFIED TYPE: Primary | ICD-10-CM

## 2024-11-20 DIAGNOSIS — Z72.821 POOR SLEEP HYGIENE: ICD-10-CM

## 2024-11-20 DIAGNOSIS — G47.20 CIRCADIAN RHYTHM DISORDER: ICD-10-CM

## 2024-11-20 DIAGNOSIS — G47.9 SLEEP DISTURBANCE: ICD-10-CM

## 2024-11-20 PROCEDURE — 99204 OFFICE O/P NEW MOD 45 MIN: CPT | Performed by: INTERNAL MEDICINE

## 2024-11-20 PROCEDURE — 1036F TOBACCO NON-USER: CPT | Performed by: INTERNAL MEDICINE

## 2024-11-20 PROCEDURE — 3008F BODY MASS INDEX DOCD: CPT | Performed by: INTERNAL MEDICINE

## 2024-11-20 ASSESSMENT — SLEEP AND FATIGUE QUESTIONNAIRES
HOW LIKELY ARE YOU TO NOD OFF OR FALL ASLEEP IN A CAR, WHILE STOPPED FOR A FEW MINUTES IN TRAFFIC: WOULD NEVER DOZE
HOW LIKELY ARE YOU TO NOD OFF OR FALL ASLEEP WHILE SITTING QUIETLY AFTER LUNCH WITHOUT ALCOHOL: WOULD NEVER DOZE
SITING INACTIVE IN A PUBLIC PLACE LIKE A CLASS ROOM OR A MOVIE THEATER: SLIGHT CHANCE OF DOZING
HOW LIKELY ARE YOU TO NOD OFF OR FALL ASLEEP WHILE WATCHING TV: SLIGHT CHANCE OF DOZING
WORRIED_DISTRESSED_DUE_TO_SLEEP: VERY MUCH NOTICEABLE
DIFFICULTY_STAYING_ASLEEP: VERY SEVERE
HOW LIKELY ARE YOU TO NOD OFF OR FALL ASLEEP WHILE SITTING AND TALKING TO SOMEONE: SLIGHT CHANCE OF DOZING
HOW LIKELY ARE YOU TO NOD OFF OR FALL ASLEEP WHILE LYING DOWN TO REST IN THE AFTERNOON WHEN CIRCUMSTANCES PERMIT: SLIGHT CHANCE OF DOZING
ESS-CHAD TOTAL SCORE: 5
HOW LIKELY ARE YOU TO NOD OFF OR FALL ASLEEP WHEN YOU ARE A PASSENGER IN A CAR FOR AN HOUR WITHOUT A BREAK: WOULD NEVER DOZE
SLEEP_PROBLEM_INTERFERES_DAILY_ACTIVITIES: MUCH
SLEEP_PROBLEM_NOTICEABLE_TO_OTHERS: NOT AT ALL NOTICEABLE
DIFFICULTY_FALLING_ASLEEP: VERY SEVERE
HOW LIKELY ARE YOU TO NOD OFF OR FALL ASLEEP WHILE SITTING AND READING: SLIGHT CHANCE OF DOZING
WAKING_TOO_EARLY: VERY SEVERE
SATISFACTION_WITH_CURRENT_SLEEP_PATTERN: VERY DISSATISFIED

## 2024-11-20 ASSESSMENT — PAIN SCALES - GENERAL: PAINLEVEL_OUTOF10: 0-NO PAIN

## 2024-11-20 NOTE — ASSESSMENT & PLAN NOTE
>>ASSESSMENT AND PLAN FOR SLEEP APNEA, UNSPECIFIED WRITTEN ON 11/20/2024  2:55 PM BY LEONID CALHOUN MD    Symptoms are suggestive of LYNETTE, but sleep testing not recommended until we improve his overall sleep at this time

## 2024-11-20 NOTE — PATIENT INSTRUCTIONS
Leopoldo exhibits issues with difficulty with initiating and maintaining sleep  Leopoldo Ko was educated on how to fill out a 2 week sleep diary and recommended he bring it back at follow-up to review sleep patterns  Recommended avoidance of caffeine in the evening and to avoid less than 2 caffeinated beverages per day  Recommended Leopoldo Ko work to improve sleep efficiency  Recommended to initiate a later bedtime  Recommended to minimize time spent in bed awake by following stimulus control recommendations  Recommended setting and waking up to an alarm at the same time on working and nonworking days  Identified a suitable sleep schedule, go to sleep at 12 AM, and wake up at 7 AM  Follow-up visit with myself in 2 weeks to review sleep diaries     Retraining Your Brain  To associate your bed with sleep  Reasoning: After a prolonged period of sleep difficulties, many people start to associate the bed not with sleep, but with wakefulness, frustration & anxiety. There are techniques to help strengthening the association between bed and sleep, while weakening the association between bed and lying awake/trying to sleep. This is the most thoroughly researched behavioral treatment for insomnia & has decades of research support.   If you have trouble staying awake until your scheduled bedtime, try some of these activities:  Work on a project or hobby that you find interesting  Read a book that is hard to put down  Watch a mystery or comedy show  Write a long letter  Call a friend  Do some stretching exercises  Stand up and walk around  Do some chores  Listen to upbeat music  Turn on all the lights  Go outside  Prepare a meal for tomorrow or for the rest of the week  Sort laundry  Organize computer files  Organize drawers & shelves  Activities for the middle of the night, so you don't lie awake in bed:  Look through catalogues  Update your address book or start a new one  Sort junk mail & bills (but don't start  "paying them)  Play solitaire with cards   Catch up on your reading (as long as it's not too stimulating)  Make a grocery list for the coming week  Create a detailed menu for dinners  De-clutter your coffee table, dining room table, kitchen countertops or desk  Create a list of activities that you'd enjoy doing on weekends & vacations  Work on a photo album or scrapbook  Fold & put away clothes  Shop for holiday, wedding or birthday gifts in catalogs  Read magazines or other light material  Make a materials list for a project around the house  Watch [employer], [employer], or the weather channel  Organize collections of CDs or DVDs & choose some to donate or sell   Give yourself a set amount of worry time & jot down anxious thoughts your mind is giving you on a notepad  Knit or do other crafts you can stop once you feel sleepy  Read your kids' books (they are often very comforting & positive)  When you have a hard time getting out of bed at the same time each day - have a few go-to activities to start as soon as you hear the alarm.  Such as:  Meditate or pray  Watch the sunrise  Take yourself or the dog for a walk  Read the news (online or the newspaper)  Workout (at home or at the gym)  Go to a store that opens early (like the grocery store)  Make your lunch for the day  Enjoy getting ready for the day without rushing  Sort out things for a yard sale or to donate  Start a budget   Check your email  Pay some bills  Start preparation for dinner (marinating, chopping veggies)  Make the bed & tidy up your bedroom  Open the curtains/blinds throughout the house & turn on the lights  Sweep your sidewalk/steps or shovel snow  Do some light gardening or water your plants  Review you to-do list for the day/week     How to adjust your  circadian rhythm.    1. Purchase and wear blue light blocking \"jone sunglasses\". Wear the jone sunglasses in the evening at least 2 hours before your planned bedtime, starting at 10 PM.  This " will reduce bluelight exposure which prevents the natural release of melatonin from your brain.  2. Eliminate all blue light devices including electronics 1 hour prior to bed time, at 11 PM.  3.  That hour before your bedtime, do a relaxing activity

## 2024-11-20 NOTE — ASSESSMENT & PLAN NOTE
Unclear etiology, but likely sleep hygiene, ? Anxiety, ? Untreated LYNETTE. May benefit from formal CBT-I

## 2024-11-20 NOTE — PROGRESS NOTES
Patient: Leopoldo Ko    [id number]  : 1975 -- AGE 49 y.o.    Provider: Emery Sin MD     Location [facility]   Service Date: 2024              [facility]  New Visit Note      Subjective     HISTORY OF PRESENT ILLNESS     The patient's referring provider is: Oskar Mayes MD    HISTORY OF PRESENT ILLNESS   Leopoldo Ko is a 49 y.o. male who presents to a [facility] for a sleep medicine evaluation with concerns of Insomnia, Excessive Daytime Sleepiness, Unrefreshing Sleep, and waking up at night .     The patient  has a past medical history of Arthritis, GERD (gastroesophageal reflux disease), and Hypertension..    PAST SLEEP HISTORY  Prior sleep study results:   10/28/2024: HSAT: AHI 3% 2.1, AHI 4% 2.1, JUAN ANTONIO 0.0    CURRENT HISTORY    On today's visit, the patient presents to discuss Insomnia, Excessive Daytime Sleepiness, Unrefreshing Sleep, and waking up at night      Sleep schedule  on weekdays / work days:  Usual Bedtime 9 to 11 PM  Falls asleep around?  Wake time 2:20 AM to 4 AM  Naps  No     Total sleep time average is ? Hours/day        He previously would go to bed. Having difficulty getting back to sleep.   He lived in [place] in .   Deployed with the [employer]  - .  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       REVIEW OF SYSTEMS     REVIEW OF SYSTEMS  Review of Systems    Sleep-related ROS:  snoring, witnessed apneas, nocturnal awakenings, waking up gasping or choking for air, nocturia, waking up sweaty/night sweats, experience heartburn or sour taste in mouth at night, sore throat in the morning, and headaches in the morning    Sleep environment:  He typically sleeps on the couch, pacing, on the floor or in another bedroom  Typically would need to be at work around 8:45 AM  Bedtime 12 AM  Earliest to wake  is 7 AM  Bed partner :  Yes   Pets in bed :   No   Bedroom temperature: BEDROOM TEMP: WNL  Noise :   No   Issues with bed comfort :   No     Sleep Initiation: takes 3-4 hours to fall asleep  Problems going to sleep associated with: rumination/thoughts/worries, frustration with not sleeping, clock watching, environement, electronic devices, and or book on tape    Sleep Maintenance: prolonged awakenings and wakes up about 1 times per night and it takes a few hours to fall asleep and  Problems staying asleep associated with: Problems Staying Asleep: nocturia, no clear reason, and turns the phone or TV on    RLS Screen:  - Sensations: Patient does not have unusual sensations in their extremities that cause an urge to move them     Sleep-related behaviors:   DENIES  sleep paralysis  symptoms of cataplexy  sleep walking  kicking, punching, or acting out dreams  REPORTS  dreams upon falling asleep  hypnagogic/hypnopompic hallucinations    Daytime Symptoms    Patient reports some daytime symptoms including: DAYTIME SYMPTOMS: late to work/school due to sleepiness irritability during the day difficulty with memory or concentration during the day feeling sleepy when driving    ESS 5   Insomnia Severity Index  1. Difficulty falling asleep: Very Severe  2. Difficulty staying asleep: Very Severe  3. Problems waking up too early: Very Severe  4. How SATISFIED/DISSATISFIED are you with your CURRENT sleep pattern?: Very Dissatisfied  5. How NOTICEABLE to others do you think your sleep problem is in terms of impairing the quality of your life?: Not at all Noticeable  6. How WORRIED/DISTRESSED are you about your current sleep problem?: Very Much Noticeable  7. To what extent do you consider your sleep problem to INTERFERE with your daily functioning (e.g. daytime fatigue, mood, ability to function at work/daily chores, concentration, memory, mood, etc.) CURRENTLY?: Much  JAIDA TS: 0-7 = No clinically significant insomnia 8-14 =  "Subthreshold insomnia 15-21 = Clinical insomnia (moderate severity) 22-28 = Clinical insomnia (severe): 23     FOSQ 20      ALLERGIES AND MEDICATIONS     ALLERGIES  No Known Allergies    MEDICATIONS  Current Outpatient Medications   Medication Sig Dispense Refill    esomeprazole (NexIUM) 20 mg DR capsule Take 1 capsule (20 mg) by mouth once daily in the morning. Take before meals. Do not open capsule.      ibuprofen 400 mg tablet Take 1 tablet (400 mg) by mouth every 6 hours if needed for moderate pain (4 - 6).      phentermine 37.5 mg capsule Take 1 capsule (37.5 mg) by mouth once daily in the morning. Take before meals. 30 capsule 0    valsartan-hydrochlorothiazide (Diovan HCT) 80-12.5 mg tablet Take 1 tablet by mouth once daily. 30 tablet 3     No current facility-administered medications for this visit.       PAST HISTORY     PAST MEDICAL HISTORY  He  has a past medical history of Arthritis, GERD (gastroesophageal reflux disease), and Hypertension.    PAST SURGICAL HISTORY:  Past Surgical History:   Procedure Laterality Date    LASIK  2017    MENISCECTOMY         FAMILY HISTORY  Family History   Problem Relation Name Age of Onset    No Known Problems Mother      No Known Problems Father       DOES/DOES NOT EC: does not have a family history of sleep disorder.      SOCIAL HISTORY  He  reports that he has never smoked. He has never used smokeless tobacco. He reports current alcohol use of about 2.0 standard drinks of alcohol per week. He reports that he does not use drugs.    Patient SOCIAL HISTORY : denies nicotine use  reports alcohol use  reports caffeine use  denies marijuana use  consumes 1-2 caffeinated beverages/day  drinks caffeine until the morning and up to noon       PHYSICAL EXAM   Objective   VITAL SIGNS: /80   Pulse (!) 11   Ht 1.753 m (5' 9\")   Wt 108 kg (238 lb)   SpO2 95%   BMI 35.15 kg/m²      CURRENT WEIGHT:   Vitals:    11/20/24 1419   Weight: 108 kg (238 lb)      BMI: Body mass " index is 35.15 kg/m².   PREVIOUS WEIGHTS:  Wt Readings from Last 3 Encounters:   11/20/24 108 kg (238 lb)   10/17/24 113 kg (250 lb)   10/08/24 107 kg (235 lb)       Physical Exam  PHYSICAL EXAM: GENERAL: alert pleasant and cooperative no acute distress  nasal mucosa  and normal  MODIFIED BAKER SCORE: II  MODIFIED MALLAMPATI SCORE: II (hard and soft palate, upper portion of tonsils anduvula visible)  UVULA: midline  TONSILLAR SCORE: 0  TONGUE SCALLOPING: midline protrusion  PSYCH EXAM: alert,oriented, in NAD with a full range of affect, normal behavior and no psychotic features      RESULTS/DATA     Bicarbonate (mmol/L)   Date Value   09/16/2024 27   08/07/2023 31           ASSESSMENT/PLAN     Mr. Ko is a 49 y.o. male and  has a past medical history of Arthritis, GERD (gastroesophageal reflux disease), and Hypertension. He was referred to the [facility] for evaluation of Insomnia, Excessive Daytime Sleepiness, Unrefreshing Sleep, and waking up at night        Problem List and Orders  Problem List Items Addressed This Visit             ICD-10-CM    Insomnia, unspecified G47.00     Unclear etiology, but likely sleep hygiene, ? Anxiety, ? Untreated LYNETTE. May benefit from formal CBT-I         Sleep apnea, unspecified - Primary G47.30     Symptoms are suggestive of LYNETTE, but sleep testing not recommended until we improve his overall sleep at this time         Poor sleep hygiene Z72.821     Decrease electronic use close to bedtime         Circadian rhythm disorder G47.20     I suspect he may have an advanced phase sleep disorder, but need sleep diaries to clarify. 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           Other Visit Diagnoses         Codes    Sleep disturbance     G47.9

## 2024-11-20 NOTE — ASSESSMENT & PLAN NOTE
I suspect he may have an advanced phase sleep disorder, but need sleep diaries to clarify. 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

## 2024-11-20 NOTE — ASSESSMENT & PLAN NOTE
Symptoms are suggestive of LYNETTE, but sleep testing not recommended until we improve his overall sleep at this time

## 2024-11-21 ENCOUNTER — TREATMENT (OUTPATIENT)
Dept: PHYSICAL THERAPY | Facility: CLINIC | Age: 49
End: 2024-11-21
Payer: COMMERCIAL

## 2024-11-21 DIAGNOSIS — M75.81 ROTATOR CUFF TENDINITIS, RIGHT: Primary | ICD-10-CM

## 2024-11-21 PROCEDURE — 97110 THERAPEUTIC EXERCISES: CPT | Mod: GP | Performed by: PHYSICAL THERAPIST

## 2024-11-21 PROCEDURE — 97140 MANUAL THERAPY 1/> REGIONS: CPT | Mod: GP | Performed by: PHYSICAL THERAPIST

## 2024-11-21 NOTE — PROGRESS NOTES
PHYSICAL THERAPY TREATMENT NOTE    Patient Name:  Leopoldo Ko   Patient MRN: [id number]  Date: 2024  Time Calculation  Start Time: 1000  Stop Time: 1044  Time Calculation (min): 44 min      Problem List Items Addressed This Visit             ICD-10-CM    Rotator cuff tendinitis, right - Primary M75.81     Insurance:  Visit number: 12 of 60V PCY 0 USED NO AUTH NEEDED PAYS %   Insurance Type: Payor: [facility] / Plan: [facility] / Product Type: *No Product type*     General:  Reason for visit: DOS: 24: Right shoulder arthroscopy with extensive debridement and subacromial decompression    Referred by: Dr Giovanny Machado MD appt:       Precautions:  Per SAD protocol.  10/24 = 4 weeks.   = 8 weeks.  : 12 weeks.   Fall Risk: None    Subjective:    Doing better, has been using his arm more for things like taking out the kitchen trash, other than that no lifting. Self care with his arm is going well . Was involved with family  so has not been doing his HEP.   Pain (0-10): 0   HEP adherence / understanding:partial compliance with the instructed home exercises.    Subjective:   Leopoldo reports he feels like his condition is improving. Patient reports Improved ability to sleep thru the night. Improved lifting ability. Improved reaching ability.   Pain (0-10): 1    HEP adherence / understanding: compliance with the instructed home exercises.    Assessment:   Education: Reviewed home exercise program.  7 weeks post op.   Progress towards functional goals:  Making excellent progress towards goals.  ROM Flexion and ER are WNL.  He has slight limitation with IR, but continues to improve.  Pain is minimal.  Strength is progressing very well.    Response to interventions: Patient tolerated therapeutic interventions well and without any  "adverse events. Patient was appropriately fatigued with no complaints.  Justification for continued skilled care: To address remaining functional, objective and subjective deficits to allow the patient to return to full independence with ADLs.    Plan:  Exercises targeting surrounding musculature to strenghten and improve function. Manual therapy to improve joint mobility. Continued education on techniques such as ice, compression and elevation to manage pain and swelling.    Objective:   R shoulder.  10/28/24 AROM(after stretching flex 155 IR PSIS; PROM: Flexion 160, ER in 90 Abd 85.   11/7/24 AROM shoulder flexion 155      Treatment Performed: (\"NP\" = Not Performed)     Therapeutic Exercise:     34 minutes  Access Code: [email or web address]  Arm Bike: L 15 - 2/2  Pulleys facing away 3 minutes - Emphasize aggressive stretching  Bicep Curl: 8# 3 x 10   Unilateral CC Pulldown 15# 3 x 10   Unilateral CC Row 25#   3 x 10 ea UE  CC Press: 15# 3 x 10   Pulleys IR stretch 2 minutes  Horiz Add stretch 10\" x 5   Standing OH Press:  3# 3 x 10   Rhythmic stabs: ER, OH, 90 Flexion Yellow 30\" ea  OH ball dribble 30\" x 3  Serrratus wall slide: Yellow x 10   **ACTIVITIES BELOW WERE NOT PERFORMED**   Drop and catch: Green ball: 3 x 10 ea UE  Standing Abduction/Scaption/Flexion:  3# x 10    Standing OH ABC with Yellow band resistance: x 1   Sleeper stretch 10 sec 5x    Manual Therapy:     10 minutes  Supine PROM flex/Abd/ER in multiple angles.  Distraction/oscillation.  TPR to the lower trap and infraspinatus, and UT.    Neuromuscular Re-education:      minutes        Modalities:       Vasopneumatic Device       minutes  Electrical Stimulation          minutes  Ultrasound            minutes  Iontophoresis                     minutes  Cold Pack            minutes  Mechanical Traction           minutes               "

## 2024-11-25 ENCOUNTER — APPOINTMENT (OUTPATIENT)
Dept: PRIMARY CARE | Facility: CLINIC | Age: 49
End: 2024-11-25
Payer: COMMERCIAL

## 2024-11-25 ENCOUNTER — LAB (OUTPATIENT)
Dept: LAB | Facility: LAB | Age: 49
End: 2024-11-25
Payer: COMMERCIAL

## 2024-11-25 VITALS
HEIGHT: 69 IN | RESPIRATION RATE: 18 BRPM | SYSTOLIC BLOOD PRESSURE: 120 MMHG | BODY MASS INDEX: 35.55 KG/M2 | DIASTOLIC BLOOD PRESSURE: 80 MMHG | WEIGHT: 240 LBS

## 2024-11-25 DIAGNOSIS — E78.2 MIXED HYPERLIPIDEMIA: ICD-10-CM

## 2024-11-25 DIAGNOSIS — R53.83 MALAISE AND FATIGUE: ICD-10-CM

## 2024-11-25 DIAGNOSIS — I10 HYPERTENSION, UNSPECIFIED TYPE: ICD-10-CM

## 2024-11-25 DIAGNOSIS — E66.812 CLASS 2 SEVERE OBESITY DUE TO EXCESS CALORIES WITH SERIOUS COMORBIDITY AND BODY MASS INDEX (BMI) OF 36.0 TO 36.9 IN ADULT: Primary | ICD-10-CM

## 2024-11-25 DIAGNOSIS — N40.0 BENIGN PROSTATIC HYPERPLASIA WITHOUT LOWER URINARY TRACT SYMPTOMS: ICD-10-CM

## 2024-11-25 DIAGNOSIS — R53.81 MALAISE AND FATIGUE: ICD-10-CM

## 2024-11-25 DIAGNOSIS — E66.01 CLASS 2 SEVERE OBESITY DUE TO EXCESS CALORIES WITH SERIOUS COMORBIDITY AND BODY MASS INDEX (BMI) OF 36.0 TO 36.9 IN ADULT: Primary | ICD-10-CM

## 2024-11-25 DIAGNOSIS — D72.9 ABNORMAL WBC COUNT: ICD-10-CM

## 2024-11-25 LAB
APPEARANCE UR: CLEAR
BASOPHILS # BLD AUTO: 0.01 X10*3/UL (ref 0–0.1)
BASOPHILS NFR BLD AUTO: 0.4 %
BILIRUB UR STRIP.AUTO-MCNC: NEGATIVE MG/DL
BURR CELLS BLD QL SMEAR: NORMAL
CHOLEST SERPL-MCNC: 220 MG/DL (ref 0–199)
CHOLESTEROL/HDL RATIO: 3.6
COLOR UR: YELLOW
EOSINOPHIL # BLD AUTO: 0.09 X10*3/UL (ref 0–0.7)
EOSINOPHIL NFR BLD AUTO: 3.6 %
ERYTHROCYTE [DISTWIDTH] IN BLOOD BY AUTOMATED COUNT: 14.5 % (ref 11.5–14.5)
GLUCOSE UR STRIP.AUTO-MCNC: NORMAL MG/DL
HCT VFR BLD AUTO: 42.9 % (ref 41–52)
HDLC SERPL-MCNC: 60.9 MG/DL
HGB BLD-MCNC: 14.1 G/DL (ref 13.5–17.5)
IMM GRANULOCYTES # BLD AUTO: 0 X10*3/UL (ref 0–0.7)
IMM GRANULOCYTES NFR BLD AUTO: 0 % (ref 0–0.9)
KETONES UR STRIP.AUTO-MCNC: NEGATIVE MG/DL
LDLC SERPL CALC-MCNC: 141 MG/DL
LEUKOCYTE ESTERASE UR QL STRIP.AUTO: NEGATIVE
LYMPHOCYTES # BLD AUTO: 1.26 X10*3/UL (ref 1.2–4.8)
LYMPHOCYTES NFR BLD AUTO: 50.4 %
MCH RBC QN AUTO: 30.8 PG (ref 26–34)
MCHC RBC AUTO-ENTMCNC: 32.9 G/DL (ref 32–36)
MCV RBC AUTO: 94 FL (ref 80–100)
MONOCYTES # BLD AUTO: 0.38 X10*3/UL (ref 0.1–1)
MONOCYTES NFR BLD AUTO: 15.2 %
NEUTROPHILS # BLD AUTO: 0.76 X10*3/UL (ref 1.2–7.7)
NEUTROPHILS NFR BLD AUTO: 30.4 %
NITRITE UR QL STRIP.AUTO: NEGATIVE
NON HDL CHOLESTEROL: 159 MG/DL (ref 0–149)
NRBC BLD-RTO: 0 /100 WBCS (ref 0–0)
OVALOCYTES BLD QL SMEAR: NORMAL
PH UR STRIP.AUTO: 6 [PH]
PLATELET # BLD AUTO: 286 X10*3/UL (ref 150–450)
PROT UR STRIP.AUTO-MCNC: NEGATIVE MG/DL
PSA SERPL-MCNC: 0.52 NG/ML
RBC # BLD AUTO: 4.58 X10*6/UL (ref 4.5–5.9)
RBC # UR STRIP.AUTO: NEGATIVE /UL
RBC MORPH BLD: NORMAL
SP GR UR STRIP.AUTO: 1.03
T4 FREE SERPL-MCNC: 0.84 NG/DL (ref 0.61–1.12)
TRIGL SERPL-MCNC: 89 MG/DL (ref 0–149)
UROBILINOGEN UR STRIP.AUTO-MCNC: NORMAL MG/DL
VLDL: 18 MG/DL (ref 0–40)
WBC # BLD AUTO: 2.5 X10*3/UL (ref 4.4–11.3)

## 2024-11-25 PROCEDURE — 99213 OFFICE O/P EST LOW 20 MIN: CPT | Performed by: FAMILY MEDICINE

## 2024-11-25 PROCEDURE — 81003 URINALYSIS AUTO W/O SCOPE: CPT

## 2024-11-25 PROCEDURE — 3008F BODY MASS INDEX DOCD: CPT | Performed by: FAMILY MEDICINE

## 2024-11-25 PROCEDURE — 36415 COLL VENOUS BLD VENIPUNCTURE: CPT

## 2024-11-25 PROCEDURE — 1036F TOBACCO NON-USER: CPT | Performed by: FAMILY MEDICINE

## 2024-11-25 PROCEDURE — 85025 COMPLETE CBC W/AUTO DIFF WBC: CPT

## 2024-11-25 PROCEDURE — 3079F DIAST BP 80-89 MM HG: CPT | Performed by: FAMILY MEDICINE

## 2024-11-25 PROCEDURE — 80061 LIPID PANEL: CPT

## 2024-11-25 PROCEDURE — 84439 ASSAY OF FREE THYROXINE: CPT

## 2024-11-25 PROCEDURE — 84153 ASSAY OF PSA TOTAL: CPT

## 2024-11-25 PROCEDURE — 3074F SYST BP LT 130 MM HG: CPT | Performed by: FAMILY MEDICINE

## 2024-11-26 NOTE — PROGRESS NOTES
PHYSICAL THERAPY TREATMENT NOTE    Patient Name:  Leopoldo Ko   Patient MRN: [id number]  Date: 11/27/2024  Time Calculation  Start Time: 1000  Stop Time: 1040  Time Calculation (min): 40 min      Problem List Items Addressed This Visit             ICD-10-CM    Rotator cuff tendinitis, right - Primary M75.81       Insurance:  Visit number: 13 of 60V PCY 0 USED NO AUTH NEEDED PAYS %   Insurance Type: Payor: [facility] / Plan: [facility] / Product Type: *No Product type*     General:  Reason for visit: DOS: 9/26/24: Right shoulder arthroscopy with extensive debridement and subacromial decompression    Referred by: Dr Giovanny Machado MD appt:  12/6 /24     Precautions:  Per SAD protocol.  10/24 = 4 weeks.  11/21 = 8 weeks.  12/19: 12 weeks.   Fall Risk: None    Subjective:   Leopoldo reports he feels like his condition is improving. Reports less stiff.     Pain (0-10): 1    HEP adherence / understanding: compliance with the instructed home exercises.    Assessment:   Education: Reviewed home exercise program.  Progress towards functional goals: Improved lifting ability. Improved reaching ability. Reduced frequency of pain. Reduced pain level.  Has mild ROM limitations of end range IR and flexion.  Response to interventions: Patient tolerated therapeutic interventions well and without any adverse events.  Justification for continued skilled care: Skilled intervention required to improve ROM which will improve function. Progressive strengthening to stabilize the R GH joint. to improve function.    Plan:  Manual therapy to improve joint mobility. Exercises to gradually increase flexibility and range of motion of the R shoulder.    Objective:   R shoulder.  10/28/24 AROM(after stretching flex 155 IR PSIS; PROM: Flexion 160, ER in 90 Abd 85.   11/7/24 AROM shoulder  "flexion 155    Treatment Performed: (\"NP\" = Not Performed)     Therapeutic Exercise:     30 minutes  Access Code: [email or web address]  Arm Bike: L 15 - 2/2  Pulleys facing away 3 minutes - Emphasize aggressive stretching  Bicep Curl: 8# 3 x 10   Unilateral CC Pulldown 15# 3 x 10   Unilateral CC Row 25# 3 x 10 ea UE  CC Press: 15# 3 x 10   Pulleys IR stretch 2 minutes  Horiz Add stretch 10\" x 5   Standing OH Press:  3# 3 x 10   Rhythmic stabs: ER, OH, 90 Flexion Yellow 30\" ea  OH ball dribble 30\" x 3  Serrratus wall slide: Yellow x 10   **ACTIVITIES BELOW WERE NOT PERFORMED**   Drop and catch: Green ball: 3 x 10 ea UE  Standing Abduction/Scaption/Flexion:  3# x 10    Standing OH ABC with Yellow band resistance: x 1   Sleeper stretch 10 sec 5x    Manual Therapy:     10 minutes  Supine PROM flex/Abd/ER in multiple angles.  IR stretching with post mobilization.  Prone Ext/IR stretching, PA mobilizations.   TPR to the infraspinatus. Distraction/oscillation.  TPR to the lower trap and infraspinatus, and UT.    Neuromuscular Re-education:      minutes        Modalities:       Vasopneumatic Device       minutes  Electrical Stimulation          minutes  Ultrasound            minutes  Iontophoresis                     minutes  Cold Pack            minutes  Mechanical Traction           minutes               "

## 2024-11-27 ENCOUNTER — TREATMENT (OUTPATIENT)
Dept: PHYSICAL THERAPY | Facility: CLINIC | Age: 49
End: 2024-11-27
Payer: COMMERCIAL

## 2024-11-27 DIAGNOSIS — M75.81 ROTATOR CUFF TENDINITIS, RIGHT: Primary | ICD-10-CM

## 2024-11-27 DIAGNOSIS — E78.2 MIXED HYPERLIPIDEMIA: ICD-10-CM

## 2024-11-27 PROCEDURE — 97140 MANUAL THERAPY 1/> REGIONS: CPT | Mod: GP | Performed by: PHYSICAL THERAPIST

## 2024-11-27 PROCEDURE — 97110 THERAPEUTIC EXERCISES: CPT | Mod: GP | Performed by: PHYSICAL THERAPIST

## 2024-11-27 RX ORDER — ROSUVASTATIN CALCIUM 10 MG/1
10 TABLET, COATED ORAL DAILY
Qty: 90 TABLET | Refills: 0 | Status: SHIPPED | OUTPATIENT
Start: 2024-11-27 | End: 2025-05-26

## 2024-11-27 NOTE — TELEPHONE ENCOUNTER
----- Message from Oskar Mayes sent at 11/27/2024  2:52 PM EST -----  Labs normal except for elevated cholesterol.  Recommend starting 10 mg of generic Crestor daily and repeat a fasting lipid panel in 4 months

## 2024-12-03 ENCOUNTER — OFFICE VISIT (OUTPATIENT)
Dept: ORTHOPEDIC SURGERY | Facility: CLINIC | Age: 49
End: 2024-12-03
Payer: COMMERCIAL

## 2024-12-03 VITALS — WEIGHT: 235 LBS | BODY MASS INDEX: 34.8 KG/M2 | HEIGHT: 69 IN

## 2024-12-03 DIAGNOSIS — M25.511 ACUTE PAIN OF RIGHT SHOULDER: Primary | ICD-10-CM

## 2024-12-03 PROCEDURE — 3008F BODY MASS INDEX DOCD: CPT | Performed by: ORTHOPAEDIC SURGERY

## 2024-12-03 PROCEDURE — 99211 OFF/OP EST MAY X REQ PHY/QHP: CPT | Performed by: ORTHOPAEDIC SURGERY

## 2024-12-04 NOTE — PROGRESS NOTES
49-year-old is seen following right shoulder arthroscopy with extensive debridement and subacromial decompression on September 26, 2024.  He is continued to progress well and is having less pain.    Pleasant in no acute distress.  Right shoulder forward flexion 160 degrees.  Good forward flexion and internal/external rotation strength.  Mild tenderness.    He will continue working further on strengthening and range of motion.  He can use Tylenol as needed.  Plan for return to work on December 16, 2024.

## 2024-12-10 ENCOUNTER — APPOINTMENT (OUTPATIENT)
Dept: SLEEP MEDICINE | Facility: CLINIC | Age: 49
End: 2024-12-10
Payer: COMMERCIAL

## 2024-12-12 ENCOUNTER — TREATMENT (OUTPATIENT)
Dept: PHYSICAL THERAPY | Facility: CLINIC | Age: 49
End: 2024-12-12
Payer: COMMERCIAL

## 2024-12-12 DIAGNOSIS — M75.81 ROTATOR CUFF TENDINITIS, RIGHT: ICD-10-CM

## 2024-12-12 PROCEDURE — 97110 THERAPEUTIC EXERCISES: CPT | Mod: GP,CQ

## 2024-12-12 PROCEDURE — 97140 MANUAL THERAPY 1/> REGIONS: CPT | Mod: GP,CQ

## 2024-12-12 NOTE — PROGRESS NOTES
PHYSICAL THERAPY TREATMENT NOTE    Patient Name:  Leopoldo Ko   Patient MRN: [id number]  Date: 12/12/2024  Time Calculation  Start Time: 1000  Stop Time: 1045  Time Calculation (min): 45 min      Problem List Items Addressed This Visit             ICD-10-CM    Rotator cuff tendinitis, right M75.81    Relevant Orders    Follow Up In Physical Therapy       Insurance:  Visit number: 14 of 60V PCY 0 USED NO AUTH NEEDED PAYS %   Insurance Type: Payor: [facility] / Plan: [facility] / Product Type: *No Product type*     General:  Reason for visit: DOS: 9/26/24: Right shoulder arthroscopy with extensive debridement and subacromial decompression    Referred by: Dr Giovanny Machado MD appt:  none scheduled     Precautions:  Per SAD protocol.  10/24 = 4 weeks.  11/21 = 8 weeks.  12/19: 12 weeks.   Fall Risk: None    Subjective:   Leopoldo reports he feels like his condition is improving. Saw MD, RTW is set for Monday for full return. Discussed continuing therapy for continued strengthening.   Pain (0-10): 1    HEP adherence / understanding: compliance with the instructed home exercises.    Assessment:   Education: Reviewed home exercise program.  Progress towards functional goals: Improved lifting ability. Improved reaching ability. Reduced frequency of pain. Reduced pain level.   Response to interventions: Patient tolerated therapeutic interventions well and without any adverse events.Muscle fatigue with overhead activity  Justification for continued skilled care: Skilled intervention required to improve ROM which will improve function. Progressive strengthening to stabilize the R GH joint. to improve function.    Plan:  Manual therapy to improve joint mobility. Exercises to gradually increase flexibility and range of motion of the R shoulder. Recheck. Pt. Also  "scheduled 1x/week for 4 more weeks per BERNIE Harrington    Objective:   R shoulder.  10/28/24 AROM(after stretching flex 155 IR PSIS; PROM: Flexion 160, ER in 90 Abd 85.   11/7/24 AROM shoulder flexion 155    Treatment Performed: (\"NP\" = Not Performed)     Therapeutic Exercise:     37 minutes  Access Code: [email or web address]  Arm Bike: L 15 - 3/3  Pulleys facing away 3 minutes - Emphasize aggressive stretching  Bicep Curl: 9# 3 x 10   Unilateral CC Pulldown 15# 3 x 10   Unilateral CC Row 25# 3 x 10 ea UE  CC Press: 15# 3 x 10   Pulleys IR stretch 2 minutes  Standing OH Press:  3# 3 x 10   Rhythmic stabs: ER, OH, 90 Flexion Yellow 30\" ea  OH ball dribble 30\" x 3  Serrratus wall slide: RED 2  x 10   Wall clocks Red 3/2/1/4/5  10x each   Body blade neutral 30 sec 2x /90 flex 30 sec 2x abd   30 sec 2 x overhead 30 sec 2x     **ACTIVITIES BELOW WERE NOT PERFORMED**   Drop and catch: Green ball: 3 x 10 ea UE  Standing Abduction/Scaption/Flexion:  3# x 10    Standing OH ABC with Yellow band resistance: x 1   Sleeper stretch 10 sec 5x  Horiz Add stretch 10\" x 5     Manual Therapy:     8 minutes  Supine PROM flex/Abd/ER in multiple angles.     Neuromuscular Re-education:      minutes        Modalities:       Vasopneumatic Device       minutes  Electrical Stimulation          minutes  Ultrasound            minutes  Iontophoresis                     minutes  Cold Pack            minutes  Mechanical Traction           minutes               "

## 2024-12-19 ENCOUNTER — APPOINTMENT (OUTPATIENT)
Dept: SLEEP MEDICINE | Facility: CLINIC | Age: 49
End: 2024-12-19
Payer: COMMERCIAL

## 2024-12-19 ENCOUNTER — TREATMENT (OUTPATIENT)
Dept: PHYSICAL THERAPY | Facility: CLINIC | Age: 49
End: 2024-12-19
Payer: COMMERCIAL

## 2024-12-19 DIAGNOSIS — Z72.821 POOR SLEEP HYGIENE: ICD-10-CM

## 2024-12-19 DIAGNOSIS — G47.00 INSOMNIA, UNSPECIFIED TYPE: ICD-10-CM

## 2024-12-19 DIAGNOSIS — Z79.899 MEDICATION MANAGEMENT: Primary | ICD-10-CM

## 2024-12-19 DIAGNOSIS — M75.81 ROTATOR CUFF TENDINITIS, RIGHT: Primary | ICD-10-CM

## 2024-12-19 DIAGNOSIS — G47.30 SLEEP APNEA, UNSPECIFIED TYPE: ICD-10-CM

## 2024-12-19 PROCEDURE — 99214 OFFICE O/P EST MOD 30 MIN: CPT | Performed by: INTERNAL MEDICINE

## 2024-12-19 PROCEDURE — 97110 THERAPEUTIC EXERCISES: CPT | Mod: GP | Performed by: PHYSICAL THERAPIST

## 2024-12-19 PROCEDURE — 97140 MANUAL THERAPY 1/> REGIONS: CPT | Mod: GP | Performed by: PHYSICAL THERAPIST

## 2024-12-19 PROCEDURE — 1036F TOBACCO NON-USER: CPT | Performed by: INTERNAL MEDICINE

## 2024-12-19 ASSESSMENT — SLEEP AND FATIGUE QUESTIONNAIRES
HOW LIKELY ARE YOU TO NOD OFF OR FALL ASLEEP WHILE LYING DOWN TO REST IN THE AFTERNOON WHEN CIRCUMSTANCES PERMIT: WOULD NEVER DOZE
HOW LIKELY ARE YOU TO NOD OFF OR FALL ASLEEP WHEN YOU ARE A PASSENGER IN A CAR FOR AN HOUR WITHOUT A BREAK: MODERATE CHANCE OF DOZING
SITING INACTIVE IN A PUBLIC PLACE LIKE A CLASS ROOM OR A MOVIE THEATER: WOULD NEVER DOZE
ESS-CHAD TOTAL SCORE: 6
HOW LIKELY ARE YOU TO NOD OFF OR FALL ASLEEP WHILE WATCHING TV: MODERATE CHANCE OF DOZING
HOW LIKELY ARE YOU TO NOD OFF OR FALL ASLEEP WHILE SITTING AND READING: MODERATE CHANCE OF DOZING
HOW LIKELY ARE YOU TO NOD OFF OR FALL ASLEEP IN A CAR, WHILE STOPPED FOR A FEW MINUTES IN TRAFFIC: WOULD NEVER DOZE
HOW LIKELY ARE YOU TO NOD OFF OR FALL ASLEEP WHILE SITTING QUIETLY AFTER LUNCH WITHOUT ALCOHOL: WOULD NEVER DOZE
HOW LIKELY ARE YOU TO NOD OFF OR FALL ASLEEP WHILE SITTING AND TALKING TO SOMEONE: WOULD NEVER DOZE

## 2024-12-19 NOTE — PROGRESS NOTES
PHYSICAL THERAPY TREATMENT NOTE    Patient Name:  Leopoldo Ko   Patient MRN: [id number]  Date: 12/19/2024  Time Calculation  Start Time: 1000  Stop Time: 1041  Time Calculation (min): 41 min      Problem List Items Addressed This Visit             ICD-10-CM    Rotator cuff tendinitis, right - Primary M75.81       Insurance:  Visit number: 15 of 60V PCY 0 USED NO AUTH NEEDED PAYS %   Insurance Type: Payor: [facility] / Plan: [facility] / Product Type: *No Product type*     General:  Reason for visit: DOS: 9/26/24: Right shoulder arthroscopy with extensive debridement and subacromial decompression    Referred by: Dr Giovanny Machado MD appt:  none scheduled     Precautions:  Per SAD protocol.  10/24 = 4 weeks.  11/21 = 8 weeks.  12/19: 12 weeks.   Fall Risk: None    Subjective:   Leopoldo reports he feels like his condition is improving. Patient reports  end range ROM restriction.  R shoulder does not look like Left when he reaches OH   Pain (0-10): 0    HEP adherence / understanding: compliance with the instructed home exercises.    Assessment:   Education: Reviewed home exercise program.  Progress towards functional goals:  Making excellent progress.  Primary deficit is end ROM restriction with AROM.  PROM is painful at the end range of flexion.  Slightly painful end range ER.  Response to interventions:  Aggressively stretched today.   Patient tolerated therapeutic interventions well and without any adverse events.  Justification for continued skilled care: To address remaining functional, objective and subjective deficits to allow the patient to return to full independence with ADLs.    Plan:  Exercises targeting surrounding musculature to strengthen and improve function. Manual therapy to improve joint mobility.  Continue PT for plyo strengthening and  "manual therapy.    Objective:     R shoulder.  10/28/24 AROM(after stretching flex 155 IR PSIS; PROM: Flexion 160, ER in 90 Abd 85.   11/7/24 AROM shoulder flexion 155  12/19: Full flexion AROM but has slight UT compensation. AROM flexion: 155, Abd 155.  IR is slightly limited to about 8 inches below inf scap angle.  PROM ER in 90 = 95 degrees.  MMT of R shoulder is 5/5 in ER/IR/Flex/Abd; every day = 5%     Treatment Performed: (\"NP\" = Not Performed)     Therapeutic Exercise:     31 minutes  Access Code: [email or web address]  Arm Bike: L 10 - 3/3  OH ball dribble 30\" x 2  Drop and catch ( from OH)  Red ball: 2 x 20   Drop and catch flex/scapt/abd x 10 ea  Plyo pushup x 20   Chest pass 4# x 20   OH slam 8# x 20   Pulleys facing away 3 minutes - Emphasize aggressive stretching  Bicep Curl: 10# 3 x 10   Unilateral CC Pulldown 15# 3 x 10   Unilateral CC Row 25# 3 x 10 ea UE  **ACTIVITIES BELOW WERE NOT PERFORMED**       **ACTIVITIES BELOW WERE NOT PERFORMED**   Drop and catch: Green ball: 3 x 10 ea UE  CC Press: 15# 3 x 10   Pulleys IR stretch 2 minutes  Standing OH Press:  5# 3 x 10   Rhythmic stabs: ER, OH, 90 Flexion Yellow 30\" ea    Serrratus wall slide: RED 2  x 10   Wall clocks Red 3/2/1/4/5  10x each   Body blade neutral 30 sec 2x /90 flex 30 sec 2x abd   30 sec 2 x overhead 30 sec 2x   Standing Abduction/Scaption/Flexion:  3# x 10    Standing OH ABC with Yellow band resistance: x 1   Sleeper stretch 10 sec 5x  Horiz Add stretch 10\" x 5     Manual Therapy:     10 minutes  Supine PROM flex/Abd/ER in multiple angles.     Neuromuscular Re-education:      minutes        Modalities:       Vasopneumatic Device       minutes  Electrical Stimulation          minutes  Ultrasound            minutes  Iontophoresis                     minutes  Cold Pack            minutes  Mechanical Traction           minutes               "

## 2024-12-19 NOTE — ASSESSMENT & PLAN NOTE
He had a negative HSAT, but continues to have sleep disruption. Needs in lab testing, suspicion for LYNETTE and evaluate for other sleep disorder.  Leopoldo verbalized understanding

## 2024-12-19 NOTE — ASSESSMENT & PLAN NOTE
Following some stimulus control, reinforced the need to work on getting out of bed when waking in the middle of the night and being inactive. Will trial Ambien. Controlled substance agreement (CSA) sent to review and sign. Urine drug screen ordered as part of CSA. Will send medication once I receive the results of the urine drug screen

## 2024-12-19 NOTE — ASSESSMENT & PLAN NOTE
>>ASSESSMENT AND PLAN FOR SLEEP APNEA, UNSPECIFIED WRITTEN ON 12/19/2024  4:47 PM BY LEONID CALHOUN MD    He had a negative HSAT, but continues to have sleep disruption. Needs in lab testing, suspicion for LYNETTE and evaluate for other sleep disorder.  Leopoldo verbalized understanding

## 2024-12-19 NOTE — PROGRESS NOTES
Patient: Leopoldo Ko    [id number]  : 1975 -- AGE 49 y.o.    Provider: Emery Sin MD     Location [facility]   Service Date: 2024              [facility]  Followup Visit Note      Virtual or Telephone Consent  An interactive audio and video telecommunication system which permits real time communications between the patient (at the originating site) and provider (at the distant site) was utilized to provide this telehealth service.   Verbal consent was requested and obtained from Leopoldo Ko on this date, 24 for a telehealth visit.       HISTORY OF PRESENT ILLNESS     The patient's referring provider is: No ref. provider found    HISTORY OF PRESENT ILLNESS   Leopoldo Ko is a 49 y.o. male who presents to a [facility] for followup.     The patient  has a past medical history of Arthritis, GERD (gastroesophageal reflux disease), and Hypertension..    PAST SLEEP HISTORY  Prior sleep study results:   10/28/2024: HSAT: AHI 3% 2.1, AHI 4% 2.1, JUAN ANTONIO 0.0  2024: Office Visit: Dr. Emery Sin:On today's visit, the patient presents to discuss Insomnia, Excessive Daytime Sleepiness, Unrefreshing Sleep, and waking up at night      Sleep schedule  on weekdays / work days:  Usual Bedtime 9 to 11 PM  Falls asleep around?  Wake time 2:20 AM to 4 AM  Naps  No      Total sleep time average is ? Hours/day  He previously would go to bed. Having difficulty getting back to sleep.   He lived in [place] in .   Deployed with the [employer]  - .  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   ESS 5, JAIDA 23, FOSQ 20    Suspect advanced sleep phase disorder.  Condition started time he was around the [employer].  Decrease electronic use close to bedtime.  Provided sleep diaries and close clinical follow-up    CURRENT HISTORY  Purpose of  today's visit was his insomnia. He did not fill out his sleep diaries as requested  On today's visit, the patient reports continued difficulty with sleep maintenance  He reports that he is waking up every night, regardless of the time that falls asleep  Regardless of alcohol or not, or caffeine intake he continues to wake up and has difficulty getting back to sleep    Sleep Schedule: He goes to bed at 10 PM and his sleep latency is 30 -60 minutes He wakes by  2:30 AM, has difficulty getting back to sleep. He tends to lay in bed and may pace, clean, takes a hot bath for a few hours, he is unable to fall asleep, turns TV off. He will fall back asleep, sleeps for 1.5 hours, typically is waking about 6:30 - 7 AM and then he has difficulty functioning in the morning.  He gets about 4 hours of sleep per night.  He tries not disturb       ESS: 6     OARRS:  No data recorded  I have personally reviewed the OARRS report for Leopoldo Ko. I have considered the risks of abuse, dependence, addiction and diversion and I believe that it is clinically appropriate for Leopoldo Ko to be prescribed this medication    Is the patient prescribed a combination of a benzodiazepine and opioid?  No    Last Urine Drug Screen / ordered today: Yes  No results found for this or any previous visit (from the past 8760 hours).  N/A        Controlled Substance Agreement:  Date of the Last Agreement: 12/19/24   Reviewed Controlled Substance Agreement including but not limited to the benefits, risks, and alternatives to treatment with a Controlled Substance medication(s).    Sleep Aids:   What is the patient's goal of therapy? To assist with sleep maintenance insomnia  Is this being achieved with current treatment? Just starting therapy     REVIEW OF SYSTEMS     REVIEW OF SYSTEMS  Review of Systems      ALLERGIES AND MEDICATIONS     ALLERGIES  No Known Allergies    MEDICATIONS: He has a current medication list which includes the following  "prescription(s): esomeprazole - Take 1 capsule (20 mg) by mouth once daily in the morning. Take before meals. Do not open capsule, rosuvastatin - Take 1 tablet (10 mg) by mouth once daily, valsartan-hydrochlorothiazide - Take 1 tablet by mouth once daily, and phentermine - Take 1 capsule (37.5 mg) by mouth once daily in the morning. Take before meals.    PAST MEDICAL HISTORY : He  has a past medical history of Arthritis, GERD (gastroesophageal reflux disease), and Hypertension.    PAST SURGICAL HISTORY: He  has a past surgical history that includes Meniscectomy and LASIK (2017).     FAMILY HISTORY: No changes since previous visit. Otherwise non-contributory as charted.       SOCIAL HISTORY  He  reports that he has never smoked. He has never used smokeless tobacco. He reports current alcohol use of about 2.0 standard drinks of alcohol per week. He reports that he does not use drugs.       PHYSICAL EXAM     VITAL SIGNS: There were no vitals taken for this visit.     CURRENT WEIGHT: [unfilled]  BMI: [unfilled]  PREVIOUS WEIGHTS:  Wt Readings from Last 3 Encounters:   12/03/24 107 kg (235 lb)   11/25/24 109 kg (240 lb)   11/20/24 108 kg (238 lb)       Physical Exam  PHYSICAL EXAM: GENERAL: alert pleasant and cooperative no acute distress  PSYCH EXAM: alert,oriented, in NAD with a full range of affect, normal behavior and no psychotic features      RESULTS/DATA     No results found for: \"IRON\", \"TRANSFERRIN\", \"IRONSAT\", \"TIBC\", \"FERRITIN\"      ASSESSMENT/PLAN     Mr. Ko is a 49 y.o. male and  has a past medical history of Arthritis, GERD (gastroesophageal reflux disease), and Hypertension. He returns in followup to the [facility] for their insomnia.    Problem List and Orders  Problem List Items Addressed This Visit             ICD-10-CM    Insomnia, unspecified G47.00     Following some stimulus control, reinforced the need to work on getting out of bed when waking in the middle of the " night and being inactive. Will trial Ambien. Controlled substance agreement (CSA) sent to review and sign. Urine drug screen ordered as part of CSA. Will send medication once I receive the results of the urine drug screen         Sleep apnea, unspecified G47.30     He had a negative HSAT, but continues to have sleep disruption. Needs in lab testing, suspicion for LYNETTE and evaluate for other sleep disorder.  Leopoldo verbalized understanding         Poor sleep hygiene Z72.821     Other Visit Diagnoses         Codes    Medication management    -  Primary Z79.899    Relevant Orders    Drug Screen, Urine With Reflex to Confirmation

## 2024-12-26 ENCOUNTER — TREATMENT (OUTPATIENT)
Dept: PHYSICAL THERAPY | Facility: CLINIC | Age: 49
End: 2024-12-26
Payer: COMMERCIAL

## 2024-12-26 DIAGNOSIS — M75.81 ROTATOR CUFF TENDINITIS, RIGHT: ICD-10-CM

## 2024-12-26 PROCEDURE — 97110 THERAPEUTIC EXERCISES: CPT | Mod: GP,CQ

## 2024-12-26 NOTE — PROGRESS NOTES
PHYSICAL THERAPY TREATMENT NOTE    Patient Name:  Leopoldo Ko   Patient MRN: [id number]  Date: 12/26/2024  Time Calculation  Start Time: 1140  Stop Time: 1215  Time Calculation (min): 35 min      Problem List Items Addressed This Visit             ICD-10-CM    Rotator cuff tendinitis, right M75.81       Insurance:  Visit number: 16 of 60V PCY 0 USED NO AUTH NEEDED PAYS %   Insurance Type: Payor: [facility] / Plan: [facility] / Product Type: *No Product type*     General:  Reason for visit: DOS: 9/26/24: Right shoulder arthroscopy with extensive debridement and subacromial decompression    Referred by: Dr Giovanny Machado MD appt:  none scheduled     Precautions:  Per SAD protocol.  10/24 = 4 weeks.  11/21 = 8 weeks.  12/19: 12 weeks.   Fall Risk: None    Subjective:   Leopoldo reports he feels like his condition is improving. Patient reports  he is doing good, was able to use his R arm yesterday to help family with getting ready for Jessica dinner.   Pain (0-10): 0    HEP adherence / understanding: compliance with the instructed home exercises.    Assessment:   Education: Reviewed home exercise program.  Progress towards functional goals:   PROM is slightly painful at the end range of flexion and ER, though overall good progress.  Response to interventions:  Continued aggressive end range stretching with no complaints. Patient tolerated therapeutic interventions well and without any adverse events.  Justification for continued skilled care: To address remaining functional, objective and subjective deficits to allow the patient to return to full independence with ADLs.    Plan:  Exercises targeting surrounding musculature to strengthen and improve function. Manual therapy to improve joint mobility.  Continue PT for plyo strengthening and manual  "therapy.    Objective:       Treatment Performed: (\"NP\" = Not Performed)     Therapeutic Exercise:     25 minutes  Access Code: [email or web address]  Arm Bike: L 10 - 3/3  OH ball dribble 30\" x 2  Drop and catch ( from OH)  Red ball: 2 x 20   Drop and catch flex/scapt/abd x 10 ea  Plyo pushup x 20     Pulleys facing away 3 minutes - Emphasize aggressive stretching  Bicep Curl: 10# 3 x 10   Unilateral CC Pulldown 15# 3 x 10   Unilateral CC Row 25# 3 x 10 ea UE  **ACTIVITIES BELOW WERE NOT PERFORMED**       **ACTIVITIES BELOW WERE NOT PERFORMED**   Drop and catch: Green ball: 3 x 10 ea UE  CC Press: 15# 3 x 10   Pulleys IR stretch 2 minutes  Standing OH Press:  5# 3 x 10   Rhythmic stabs: ER, OH, 90 Flexion Yellow 30\" ea  Chest pass 4# x 20   OH slam 8# x 20    Serrratus wall slide: RED 2  x 10   Wall clocks Red 3/2/1/4/5  10x each   Body blade neutral 30 sec 2x /90 flex 30 sec 2x abd   30 sec 2 x overhead 30 sec 2x   Standing Abduction/Scaption/Flexion:  3# x 10    Standing OH ABC with Yellow band resistance: x 1   Sleeper stretch 10 sec 5x  Horiz Add stretch 10\" x 5     Manual Therapy:     10 minutes  Supine PROM flex/Abd/ER in multiple angles.     Neuromuscular Re-education:      minutes        Modalities:       Vasopneumatic Device       minutes  Electrical Stimulation          minutes  Ultrasound            minutes  Iontophoresis                     minutes  Cold Pack            minutes  Mechanical Traction           minutes               "

## 2024-12-28 ENCOUNTER — LAB (OUTPATIENT)
Dept: LAB | Facility: LAB | Age: 49
End: 2024-12-28
Payer: COMMERCIAL

## 2024-12-28 DIAGNOSIS — Z79.899 MEDICATION MANAGEMENT: ICD-10-CM

## 2024-12-28 LAB
AMPHETAMINES UR QL SCN: NORMAL
BARBITURATES UR QL SCN: NORMAL
BENZODIAZ UR QL SCN: NORMAL
BZE UR QL SCN: NORMAL
CANNABINOIDS UR QL SCN: NORMAL
FENTANYL+NORFENTANYL UR QL SCN: NORMAL
METHADONE UR QL SCN: NORMAL
OPIATES UR QL SCN: NORMAL
OXYCODONE+OXYMORPHONE UR QL SCN: NORMAL
PCP UR QL SCN: NORMAL

## 2024-12-28 PROCEDURE — 80307 DRUG TEST PRSMV CHEM ANLYZR: CPT

## 2024-12-30 DIAGNOSIS — G47.00 INSOMNIA, UNSPECIFIED TYPE: Primary | ICD-10-CM

## 2024-12-30 RX ORDER — ZOLPIDEM TARTRATE 5 MG/1
5 TABLET ORAL NIGHTLY PRN
Qty: 30 TABLET | Refills: 2 | Status: SHIPPED | OUTPATIENT
Start: 2024-12-30 | End: 2025-03-30

## 2024-12-31 DIAGNOSIS — R63.5 WEIGHT GAIN: ICD-10-CM

## 2024-12-31 RX ORDER — PHENTERMINE HYDROCHLORIDE 37.5 MG/1
37.5 CAPSULE ORAL
Qty: 30 CAPSULE | Refills: 0 | Status: SHIPPED | OUTPATIENT
Start: 2024-12-31 | End: 2025-01-30

## 2024-12-31 NOTE — TELEPHONE ENCOUNTER
Rx Refill Request Telephone Encounter    Name:  Leopoldo Ko  : 1975     Medication Name:  phentermine  Dose (Optional):    37.5 mg  Quantity (Optional):    30  Directions (Optional):   Take 1 capsule (37.5 mg) by mouth once daily in the morning. Take before meals.     ALLERGIES:   nkda     Specific Pharmacy location:  [facility]    Date of last appointment:  24  Date of next appointment:  none     Best number to reach patient:  [phone]

## 2025-01-02 ENCOUNTER — APPOINTMENT (OUTPATIENT)
Dept: PHYSICAL THERAPY | Facility: CLINIC | Age: 50
End: 2025-01-02
Payer: COMMERCIAL

## 2025-01-09 ENCOUNTER — TREATMENT (OUTPATIENT)
Dept: PHYSICAL THERAPY | Facility: CLINIC | Age: 50
End: 2025-01-09
Payer: COMMERCIAL

## 2025-01-09 DIAGNOSIS — M75.81 ROTATOR CUFF TENDINITIS, RIGHT: Primary | ICD-10-CM

## 2025-01-09 DIAGNOSIS — I10 HYPERTENSION, UNSPECIFIED TYPE: ICD-10-CM

## 2025-01-09 PROCEDURE — 97110 THERAPEUTIC EXERCISES: CPT | Mod: GP | Performed by: PHYSICAL THERAPIST

## 2025-01-09 RX ORDER — VALSARTAN AND HYDROCHLOROTHIAZIDE 80; 12.5 MG/1; MG/1
1 TABLET, FILM COATED ORAL DAILY
Qty: 90 TABLET | Refills: 0 | Status: SHIPPED | OUTPATIENT
Start: 2025-01-09

## 2025-01-09 NOTE — PROGRESS NOTES
PHYSICAL THERAPY TREATMENT NOTE    Patient Name:  Leopoldo Ko   Patient MRN: [id number]  Date: 1/9/2025  Time Calculation  Start Time: 1045  Stop Time: 1120  Time Calculation (min): 35 min      Problem List Items Addressed This Visit             ICD-10-CM    Rotator cuff tendinitis, right - Primary M75.81       Insurance:  Visit number: 17 of 60V PCY 0 USED NO AUTH NEEDED PAYS %   Insurance Type: Payor: [facility] / Plan: [facility] / Product Type: *No Product type*     General:  Reason for visit: DOS: 9/26/24: Right shoulder arthroscopy with extensive debridement and subacromial decompression    Referred by: Dr Giovanny Machado MD appt:  none scheduled     Precautions:  Per SAD protocol.  10/24 = 4 weeks.  11/21 = 8 weeks.  12/19: 12 weeks.   Fall Risk: None    Subjective:    Did 4 x 10 pushup s about 12 days ago.  Rested then 4/10  2 days later.  This made his shoulder sore.  He cancelled last PT session due to this soreness.  Its slowly improving.   Patient reports  increased pain R shoulder for about the past 10-12 days.   Pain (0-10): 0 /10 but 4/10 with lifting.   HEP adherence / understanding: partial compliance with the instructed home exercises.    Assessment:   Education:  discussed tendonopathy treatment.  He can and should continue to exercise however he should modify activities that increase pain > 4/10.  Hold on pushup s for now.  Discussed gradual return to strengthening.  Progress towards functional goals:  setback 10 days ago with increased R shoulder pain due to pushups.  Response to interventions: Patient tolerated therapeutic interventions well and without any adverse events.  Justification for continued skilled care: To address remaining functional, objective and subjective deficits to allow the patient to return to full independence  "with ADLs.    Plan:  Next appointment will be a reassessment.    Objective:   1/9/25: ROM is full.  ER 5/5 with mild pain. Flex/abd: 5/5 with mild pain.        Treatment Performed: (\"NP\" = Not Performed)     Therapeutic Exercise:     25 minutes  Access Code: [email or web address]  Arm Bike: L 10 - 3/3  Manual Therapy - performed here.  OH KB press 7.5# 3 x 10   Supine KB press: 10# 3 x 10   Tband ER/IR: 3 x 10   OH ball dribble 30\" x 2  Drop and catch ( from OH)  Red ball: 2 x 20   Drop and catch flex/scapt/abd x 10 ea  Plyo pushup x 20 - HOLD for now  Pulleys facing away 2 minutes - Emphasize aggressive stretching  Bicep Curl: 7# 3 x 10   Unilateral CC Row 25# 3 x 10 ea UE  Unilateral CC Pulldown 15# 3 x 10         **ACTIVITIES BELOW WERE NOT PERFORMED**   Drop and catch: Green ball: 3 x 10 ea UE  CC Press: 15# 3 x 10   Pulleys IR stretch 2 minutes  Standing OH Press:  5# 3 x 10   Rhythmic stabs: ER, OH, 90 Flexion Yellow 30\" ea  Chest pass 4# x 20   OH slam 8# x 20    Serrratus wall slide: RED 2  x 10   Wall clocks Red 3/2/1/4/5  10x each   Body blade neutral 30 sec 2x /90 flex 30 sec 2x abd   30 sec 2 x overhead 30 sec 2x   Standing Abduction/Scaption/Flexion:  3# x 10    Standing OH ABC with Yellow band resistance: x 1   Sleeper stretch 10 sec 5x  Horiz Add stretch 10\" x 5     Manual Therapy:     10 minutes  Supine PROM flex/Abd/ER in multiple angles.     Neuromuscular Re-education:      minutes        Modalities:       Vasopneumatic Device       minutes  Electrical Stimulation          minutes  Ultrasound            minutes  Iontophoresis                     minutes  Cold Pack            minutes  Mechanical Traction           minutes               "

## 2025-01-15 ENCOUNTER — TREATMENT (OUTPATIENT)
Dept: PHYSICAL THERAPY | Facility: CLINIC | Age: 50
End: 2025-01-15
Payer: COMMERCIAL

## 2025-01-15 DIAGNOSIS — M75.81 ROTATOR CUFF TENDINITIS, RIGHT: Primary | ICD-10-CM

## 2025-01-15 PROCEDURE — 97140 MANUAL THERAPY 1/> REGIONS: CPT | Mod: GP | Performed by: PHYSICAL THERAPIST

## 2025-01-15 PROCEDURE — 97110 THERAPEUTIC EXERCISES: CPT | Mod: GP | Performed by: PHYSICAL THERAPIST

## 2025-01-15 NOTE — PROGRESS NOTES
PHYSICAL THERAPY TREATMENT NOTE    Patient Name:  Leopoldo Ko   Patient MRN: [id number]  Date: 1/15/2025  Time Calculation  Start Time: 1000  Stop Time: 1045  Time Calculation (min): 45 min      Problem List Items Addressed This Visit             ICD-10-CM    Rotator cuff tendinitis, right - Primary M75.81    Relevant Orders    Follow Up In Physical Therapy     Insurance:  Visit number: 18 of 60V PCY 0 USED NO AUTH NEEDED PAYS %   Insurance Type: Payor: [facility] / Plan: [facility] / Product Type: *No Product type*     General:  Reason for visit: DOS: 9/26/24: Right shoulder arthroscopy with extensive debridement and subacromial decompression    Referred by: Dr Giovanny Machado MD appt:  none scheduled     Precautions:  Per SAD protocol.  10/24 = 4 weeks.  11/21 = 8 weeks.  12/19: 12 weeks.   Fall Risk: None    Subjective:   Leopoldo reports he feels like his condition is improving.    Pain (0-10): 0     Reassessment:   Leopoldo Ko has completed 18 physical therapy sessions from 10/4/2024 to 1/15/25 to address DOS: 9/26/24: Right shoulder arthroscopy.  Treatment has included Therapeutic exercise, Manual therapy, Home program instruction and progression, Neuromuscular re-education, Therapeutic activities, Self care and home management, Instruction in activity modification, and Electrical stimulation.  he reports compliance with the instructed home exercises..  Leopoldo has made significant progress towards the established therapy goals.  Last wee in Dec 2024 he did 40 pushups followed by a day of rest then 40 more - this made his shoulder significantly sore.  He rested for a week.  His shoulder is improving but still not back to baseline.  I would liek to continue PT 1 x week x 4 weeks to address this and continue to strengthen and progress him to reach  "his goals.  At this time I recommend Leopoldo continue with physical therapy to address the following impairments: Pain and Strength.  With treatment to include Therapeutic exercise, Home program instruction and progression, Neuromuscular re-education, and Self care and home management.    Goals:   Short term goals:   -Patient will demo correct posture with min to no cueing to allow for correct loading strategy.  1/15/25: MET     -Restore full shoulder AROM   1/15/25: MET    -Patient to demonstrate correct performance of HEP for symptom management  1/15/25: MET     -Pain< 4/10  1/15/25: MET     Long term goals:   -QuickDash= 19 % disability  to indicate a significant improvement in overall function.   1/15/25: MET     -Patient will demonstrate 4/5 rotator cuff strength (all planes) to allow for correct reach/lift mechanics.  1/15/25: MET     -Patient will demo mild to no limitation AROM of the R shoulder to allow for correct mechanics with functional mobility.   1/15/25: Good Progress     -Patient will report reaching overhead without pain to allow for return to work and ADLs without limitation.  1/15/25: Good progress     -Patient will report driving without pain to allow for return to work and ADLs without limitation.   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  1/15/25: Not Met - attempted to return to working out, but pain increased significantly - he did too many pushups and not a gradual return.    Outcome Measures:  Other Measures  Disability of Arm Shoulder Hand (DASH): 29     Objective:   1/15/24:  AROM R shoulder Flexion: 165  Abd:150   ER with arm at side: 55  Arom Is symmetrical into flex/abd/ER,  IR is limited.  MMT ER/IR/Abd 5/5  Flex 4/5 with ant shoulder pain.    1/9/25: ROM is full.  ER 5/5 with mild pain. Flex/abd: 5/5 with mild pain.      Treatment Performed: (\"NP\" = Not Performed)     Therapeutic Exercise:     35 " "minutes  Access Code: [email or web address]  Arm Bike: L 10 - 3/3  Pulleys facing away 2 minutes - Emphasize aggressive stretching  Unilateral CC Row 25# 3 x 10 ea UE  Incline pushup (countertop ht)  3 x 10   OH KB press 7.5# 3 x 10   Supine KB press: 10# 3 x 10   Manual Therapy - performed here.  Tband IR silver 3 x 10   Tband ER + Flexion Green 2 x 10   Unilateral CC Pulldown 15# 3 x 10   Bicep Curl: 10# 3 x 10     **ACTIVITIES BELOW WERE NOT PERFORMED**   OH ball dribble 30\" x 2  Drop and catch ( from OH)  Red ball: 2 x 20   Drop and catch flex/scapt/abd x 10 ea    Drop and catch: Green ball: 3 x 10 ea UE  CC Press: 15# 3 x 10   Pulleys IR stretch 2 minutes  Standing OH Press:  5# 3 x 10   Rhythmic stabs: ER, OH, 90 Flexion Yellow 30\" ea  Chest pass 4# x 20   OH slam 8# x 20    Serrratus wall slide: RED 2  x 10   Wall clocks Red 3/2/1/4/5  10x each   Body blade neutral 30 sec 2x /90 flex 30 sec 2x abd   30 sec 2 x overhead 30 sec 2x   Standing Abduction/Scaption/Flexion:  3# x 10    Standing OH ABC with Yellow band resistance: x 1   Sleeper stretch 10 sec 5x  Horiz Add stretch 10\" x 5     Manual Therapy:     10 minutes  Supine PROM flex/Abd/ER in multiple angles.     Neuromuscular Re-education:      minutes        Modalities:       Vasopneumatic Device       minutes  Electrical Stimulation          minutes  Ultrasound            minutes  Iontophoresis                     minutes  Cold Pack            minutes  Mechanical Traction           minutes               "

## 2025-01-19 ENCOUNTER — APPOINTMENT (OUTPATIENT)
Dept: SLEEP MEDICINE | Facility: CLINIC | Age: 50
End: 2025-01-19
Payer: COMMERCIAL

## 2025-01-21 ENCOUNTER — DOCUMENTATION (OUTPATIENT)
Dept: PHYSICAL THERAPY | Facility: CLINIC | Age: 50
End: 2025-01-21
Payer: COMMERCIAL

## 2025-01-22 ENCOUNTER — TREATMENT (OUTPATIENT)
Dept: PHYSICAL THERAPY | Facility: CLINIC | Age: 50
End: 2025-01-22
Payer: COMMERCIAL

## 2025-01-22 DIAGNOSIS — M75.81 ROTATOR CUFF TENDINITIS, RIGHT: Primary | ICD-10-CM

## 2025-01-22 PROCEDURE — 97140 MANUAL THERAPY 1/> REGIONS: CPT | Mod: GP | Performed by: PHYSICAL THERAPIST

## 2025-01-22 PROCEDURE — 97110 THERAPEUTIC EXERCISES: CPT | Mod: GP | Performed by: PHYSICAL THERAPIST

## 2025-01-22 NOTE — PROGRESS NOTES
PHYSICAL THERAPY TREATMENT NOTE    Patient Name:  Leopoldo Ko   Patient MRN: [id number]  Date: 1/22/2025  Time Calculation  Start Time: 0955  Stop Time: 1036  Time Calculation (min): 41 min      Problem List Items Addressed This Visit             ICD-10-CM    Rotator cuff tendinitis, right - Primary M75.81       Insurance:  Visit number: 19 of 60V PCY 0 USED NO AUTH NEEDED PAYS %   Insurance Type: Payor: [facility] / Plan: [facility] / Product Type: *No Product type*     General:  Reason for visit: DOS: 9/26/24: Right shoulder arthroscopy with extensive debridement and subacromial decompression    Referred by: Dr Giovanny Machado MD appt:  none scheduled     Precautions:  Per SAD protocol.  10/24 = 4 weeks.  11/21 = 8 weeks.  12/19: 12 weeks.   Fall Risk: None    Subjective:    Continues to improve.  Not quite at baseline after the pushups, but close.  Patient reports Reduced frequency of pain. Reduced intensity of pain.   Pain (0-10): 1    HEP adherence / understanding: compliance with the instructed home exercises.    Assessment:   Education: Reviewed home exercise program.  Progress towards functional goals: Improved lifting ability. Improved reaching ability. Reduced intensity of pain. Reduced pain level.  Response to interventions: Patient tolerated therapeutic interventions well and without any adverse events.  Justification for continued skilled care: To address remaining functional, objective and subjective deficits to allow the patient to return to full independence with ADLs.    Plan:  Exercises targeting surrounding musculature to strengthen GH musculature and improve function.        Goals:   Short term goals:   -Patient will demo correct posture with min to no cueing to allow for correct loading strategy.  1/15/25: MET     -Restore full  "shoulder AROM   1/15/25: MET    -Patient to demonstrate correct performance of HEP for symptom management  1/15/25: MET     -Pain< 4/10  1/15/25: MET     Long term goals:   -QuickDash= 19 % disability  to indicate a significant improvement in overall function.   1/15/25: MET     -Patient will demonstrate 4/5 rotator cuff strength (all planes) to allow for correct reach/lift mechanics.  1/15/25: MET     -Patient will demo mild to no limitation AROM of the R shoulder to allow for correct mechanics with functional mobility.   1/15/25: Good Progress     -Patient will report reaching overhead without pain to allow for return to work and ADLs without limitation.  1/15/25: Good progress     -Patient will report driving without pain to allow for return to work and ADLs without limitation.   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  1/15/25: Not Met - attempted to return to working out, but pain increased significantly - he did too many pushups and not a gradual return.    Outcome Measures:        Objective:   1/15/24:  AROM R shoulder Flexion: 165  Abd:150   ER with arm at side: 55  Arom Is symmetrical into flex/abd/ER,  IR is limited.  MMT ER/IR/Abd 5/5  Flex 4/5 with ant shoulder pain.    1/9/25: ROM is full.  ER 5/5 with mild pain. Flex/abd: 5/5 with mild pain.      Treatment Performed: (\"NP\" = Not Performed)     Therapeutic Exercise:     33 minutes  Access Code: [email or web address]  Arm Bike: L 10 - 3/3  Pulleys facing away 2 minutes  Bicep Curl: 10# 3 x 10   Unilateral CC Row 25# 3 x 10 ea UE  Incline pushup (countertop ht)  3 x 10   OH KB press 7.5# 3 x 10   Unilateral CC Pulldown 15# 3 x 10   Supine KB press: 10# 3 x 10   Manual Therapy - performed here.  Tband IR 2 dorothy 3 x 10   Tband ER + Flexion Green 2 x 10   S/l KB press 7.5# 3 x 10     **ACTIVITIES BELOW WERE NOT PERFORMED**   OH ball dribble 30\" x 2  Drop and catch ( from OH)  Red ball: 2 x 20 " "  Drop and catch flex/scapt/abd x 10 ea    Drop and catch: Green ball: 3 x 10 ea UE  CC Press: 15# 3 x 10   Pulleys IR stretch 2 minutes  Standing OH Press:  5# 3 x 10   Rhythmic stabs: ER, OH, 90 Flexion Yellow 30\" ea  Chest pass 4# x 20   OH slam 8# x 20    Serrratus wall slide: RED 2  x 10   Wall clocks Red 3/2/1/4/5  10x each   Body blade neutral 30 sec 2x /90 flex 30 sec 2x abd   30 sec 2 x overhead 30 sec 2x   Standing Abduction/Scaption/Flexion:  3# x 10    Standing OH ABC with Yellow band resistance: x 1   Sleeper stretch 10 sec 5x  Horiz Add stretch 10\" x 5     Manual Therapy:     8 minutes  Supine PROM flex/Abd/ER in multiple angles.     Neuromuscular Re-education:      minutes        Modalities:       Vasopneumatic Device       minutes  Electrical Stimulation          minutes  Ultrasound            minutes  Iontophoresis                     minutes  Cold Pack            minutes  Mechanical Traction           minutes               "

## 2025-01-28 ENCOUNTER — DOCUMENTATION (OUTPATIENT)
Dept: PHYSICAL THERAPY | Facility: CLINIC | Age: 50
End: 2025-01-28
Payer: COMMERCIAL

## 2025-02-04 ENCOUNTER — APPOINTMENT (OUTPATIENT)
Dept: PHYSICAL THERAPY | Facility: CLINIC | Age: 50
End: 2025-02-04
Payer: COMMERCIAL

## 2025-02-05 ENCOUNTER — TREATMENT (OUTPATIENT)
Dept: PHYSICAL THERAPY | Facility: CLINIC | Age: 50
End: 2025-02-05
Payer: COMMERCIAL

## 2025-02-05 DIAGNOSIS — M75.81 ROTATOR CUFF TENDINITIS, RIGHT: Primary | ICD-10-CM

## 2025-02-05 PROCEDURE — 97110 THERAPEUTIC EXERCISES: CPT | Mod: GP | Performed by: PHYSICAL THERAPIST

## 2025-02-05 PROCEDURE — 97140 MANUAL THERAPY 1/> REGIONS: CPT | Mod: GP | Performed by: PHYSICAL THERAPIST

## 2025-02-05 NOTE — PROGRESS NOTES
PHYSICAL THERAPY TREATMENT NOTE    Patient Name:  Leopoldo Ko   Patient MRN: [id number]  Date: 2/5/2025  Time Calculation  Start Time: 1045  Stop Time: 1130  Time Calculation (min): 45 min      Problem List Items Addressed This Visit             ICD-10-CM    Rotator cuff tendinitis, right - Primary M75.81         Insurance:  Visit number: 20 of 60V PCY 0 USED NO AUTH NEEDED PAYS %   Insurance Type: Payor: [facility] / Plan: [facility] / Product Type: *No Product type*     General:  Reason for visit: DOS: 9/26/24: Right shoulder arthroscopy with extensive debridement and subacromial decompression    Referred by: Dr Giovanny Machado MD appt:  none scheduled     Precautions:  Per SAD protocol.  10/24 = 4 weeks.  11/21 = 8 weeks.  12/19: 12 weeks.   Fall Risk: None    Subjective:    Reports continues soreness in the R shoulder.  Does not really hurt, but its sore.     Pain (0-10): 4 /10 - rates it as 4/10.   HEP adherence / understanding: patient reports compliance with the instructed home exercises.    Assessment:   Education: Reviewed home exercise program.  Progress towards functional goals:  doing well, but still having some mild pain at the R acromium.  Seems to tolerate exercises well and really does not have pain with exercise.  His and PROM is mildly limited in ER and flexion.    Justification for continued skilled care: To address remaining functional, objective and subjective deficits to allow the patient to return to full independence with ADLs.    Plan:  Exercises targeting surrounding musculature to strengthen and improve function. Manual therapy to improve joint mobility. Exercises to gradually increase flexibility and range of motion of the R shoulder.    Objective:     Goals:   Short term goals:   -Patient will demo correct posture with min to  "no cueing to allow for correct loading strategy.  1/15/25: MET     -Restore full shoulder AROM   1/15/25: MET    -Patient to demonstrate correct performance of HEP for symptom management  1/15/25: MET     -Pain< 4/10  1/15/25: MET     Long term goals:   -QuickDash= 19 % disability  to indicate a significant improvement in overall function.   1/15/25: MET     -Patient will demonstrate 4/5 rotator cuff strength (all planes) to allow for correct reach/lift mechanics.  1/15/25: MET     -Patient will demo mild to no limitation AROM of the R shoulder to allow for correct mechanics with functional mobility.   1/15/25: Good Progress     -Patient will report reaching overhead without pain to allow for return to work and ADLs without limitation.  1/15/25: Good progress     -Patient will report driving without pain to allow for return to work and ADLs without limitation.   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  1/15/25: Not Met - attempted to return to working out, but pain increased significantly - he did too many pushups and not a gradual return.    Outcome Measures:        Objective:   1/15/24:  AROM R shoulder Flexion: 165  Abd:150   ER with arm at side: 55  Arom Is symmetrical into flex/abd/ER,  IR is limited.  MMT ER/IR/Abd 5/5  Flex 4/5 with ant shoulder pain.    1/9/25: ROM is full.  ER 5/5 with mild pain. Flex/abd: 5/5 with mild pain.      Treatment Performed: (\"NP\" = Not Performed)     Therapeutic Exercise:     30 minutes  Access Code: [email or web address]  Arm Bike: L 15 - 3/3  Pulleys facing away 2 minutes  Bicep Curl: 10# 3 x 10   Unilateral CC Row 25# 3 x 10 ea UE  Incline pushup (countertop ht)  3 x 10   OH KB press 7.5# 3 x 10   Unilateral CC Pulldown 15# 3 x 10   Seated shoulder 90 flexion resisted Er: 10# DB 3 x 10   Seated shoulder 90 Abd   Supine KB press: 10# 4 x 10   Manual Therapy - performed here.  Tband IR 2 dorothy 3 x 10   Tband ER + " "Flexion Green 2 x 10   S/l KB press 7.5# 3 x 10     **ACTIVITIES BELOW WERE NOT PERFORMED**   OH ball dribble 30\" x 2  Drop and catch ( from OH)  Red ball: 2 x 20   Drop and catch flex/scapt/abd x 10 ea    Drop and catch: Green ball: 3 x 10 ea UE  CC Press: 15# 3 x 10   Pulleys IR stretch 2 minutes  Standing OH Press:  5# 3 x 10   Rhythmic stabs: ER, OH, 90 Flexion Yellow 30\" ea  Chest pass 4# x 20   OH slam 8# x 20    Serrratus wall slide: RED 2  x 10   Wall clocks Red 3/2/1/4/5  10x each   Body blade neutral 30 sec 2x /90 flex 30 sec 2x abd   30 sec 2 x overhead 30 sec 2x   Standing Abduction/Scaption/Flexion:  3# x 10    Standing OH ABC with Yellow band resistance: x 1   Sleeper stretch 10 sec 5x  Horiz Add stretch 10\" x 5     Manual Therapy:     15 minutes  Supine PROM flex/Abd/ER in multiple angles.  Aggressive stretching.    Neuromuscular Re-education:      minutes        Modalities:       Vasopneumatic Device       minutes  Electrical Stimulation          minutes  Ultrasound            minutes  Iontophoresis                     minutes  Cold Pack            minutes  Mechanical Traction           minutes               "

## 2025-02-11 ENCOUNTER — TREATMENT (OUTPATIENT)
Dept: PHYSICAL THERAPY | Facility: CLINIC | Age: 50
End: 2025-02-11
Payer: COMMERCIAL

## 2025-02-11 DIAGNOSIS — M75.81 ROTATOR CUFF TENDINITIS, RIGHT: ICD-10-CM

## 2025-02-11 PROCEDURE — 97110 THERAPEUTIC EXERCISES: CPT | Mod: GP | Performed by: PHYSICAL THERAPIST

## 2025-02-11 PROCEDURE — 97140 MANUAL THERAPY 1/> REGIONS: CPT | Mod: GP | Performed by: PHYSICAL THERAPIST

## 2025-02-11 NOTE — PROGRESS NOTES
PHYSICAL THERAPY TREATMENT NOTE    Patient Name:  Leopoldo Ko   Patient MRN: [id number]  Date: 2/11/2025  Time Calculation  Start Time: 1000  Stop Time: 1038  Time Calculation (min): 38 min      Problem List Items Addressed This Visit             ICD-10-CM    Rotator cuff tendinitis, right M75.81         Insurance:  Visit number: 21 of 60V PCY 0 USED NO AUTH NEEDED PAYS %   Insurance Type: Payor: [facility] / Plan: [facility] / Product Type: *No Product type*     General:  Reason for visit: DOS: 9/26/24: Right shoulder arthroscopy with extensive debridement and subacromial decompression    Referred by: Dr Giovanny Machado MD appt:  none scheduled     Precautions:  Per SAD protocol.  10/24 = 4 weeks.  11/21 = 8 weeks.  12/19: 12 weeks.   Fall Risk: None    Subjective:    Reports continues soreness in the R shoulder.  Does not really hurt, but its sore.     Pain (0-10): 4 /10 - rates it as 4/10.   HEP adherence / understanding: patient reports compliance with the instructed home exercises.    Assessment:   Education: Reviewed home exercise program.  Progress towards functional goals:  doing well, but still having some mild pain at the R acromium.  Seems to tolerate exercises well and really does not have pain with exercise.  His and PROM is mildly limited in ER and flexion.    Justification for continued skilled care: To address remaining functional, objective and subjective deficits to allow the patient to return to full independence with ADLs.    Plan:  Exercises targeting surrounding musculature to strengthen and improve function. Manual therapy to improve joint mobility. Exercises to gradually increase flexibility and range of motion of the R shoulder.    Objective:     Goals:   Short term goals:   -Patient will demo correct posture with min to no cueing  "to allow for correct loading strategy.  1/15/25: MET     -Restore full shoulder AROM   1/15/25: MET    -Patient to demonstrate correct performance of HEP for symptom management  1/15/25: MET     -Pain< 4/10  1/15/25: MET     Long term goals:   -QuickDash= 19 % disability  to indicate a significant improvement in overall function.   1/15/25: MET     -Patient will demonstrate 4/5 rotator cuff strength (all planes) to allow for correct reach/lift mechanics.  1/15/25: MET     -Patient will demo mild to no limitation AROM of the R shoulder to allow for correct mechanics with functional mobility.   1/15/25: Good Progress     -Patient will report reaching overhead without pain to allow for return to work and ADLs without limitation.  1/15/25: Good progress     -Patient will report driving without pain to allow for return to work and ADLs without limitation.   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  1/15/25: Not Met - attempted to return to working out, but pain increased significantly - he did too many pushups and not a gradual return.    Outcome Measures:        Objective:   1/15/24:  AROM R shoulder Flexion: 165  Abd:150   ER with arm at side: 55  Arom Is symmetrical into flex/abd/ER,  IR is limited.  MMT ER/IR/Abd 5/5  Flex 4/5 with ant shoulder pain.    1/9/25: ROM is full.  ER 5/5 with mild pain. Flex/abd: 5/5 with mild pain.      Treatment Performed: (\"NP\" = Not Performed)     Therapeutic Exercise:     30 minutes  Access Code: [email or web address]  Arm Bike: L 15 - 3/3  Pulleys facing away 2 minutes  Bicep Curl: 10# 3 x 10   TRX Press 3 x 10 ( about 45 degree incline)  OH KB press10# 3 x 10   Unilateral CC Row 25# 3 x 10 ea UE  CC IR: 15# 3 x 1-0   90 flexion ER: 7# 3 x `10   90 Abd ER 5# 3 x 10   Unilateral CC Pulldown 15# 3 x 10   Supine KB press: 10# 4 x 10   S/l KB press10# 3 x 10     **ACTIVITIES BELOW WERE NOT PERFORMED**   OH ball dribble 30\" x " "2  Tband ER + Flexion Green 2 x 10   Drop and catch ( from OH)  Red ball: 2 x 20   Drop and catch flex/scapt/abd x 10 ea    Drop and catch: Green ball: 3 x 10 ea UE  CC Press: 15# 3 x 10   Pulleys IR stretch 2 minutes  Standing OH Press:  5# 3 x 10   Rhythmic stabs: ER, OH, 90 Flexion Yellow 30\" ea  Chest pass 4# x 20   OH slam 8# x 20    Serrratus wall slide: RED 2  x 10   Wall clocks Red 3/2/1/4/5  10x each   Body blade neutral 30 sec 2x /90 flex 30 sec 2x abd   30 sec 2 x overhead 30 sec 2x   Standing Abduction/Scaption/Flexion:  3# x 10    Standing OH ABC with Yellow band resistance: x 1   Sleeper stretch 10 sec 5x  Horiz Add stretch 10\" x 5     Manual Therapy:     8 minutes  Supine PROM flex/Abd/ER in multiple angles.  Aggressive stretching.    Neuromuscular Re-education:      minutes        Modalities:       Vasopneumatic Device       minutes  Electrical Stimulation          minutes  Ultrasound            minutes  Iontophoresis                     minutes  Cold Pack            minutes  Mechanical Traction           minutes               "

## 2025-02-17 ENCOUNTER — CLINICAL SUPPORT (OUTPATIENT)
Dept: SLEEP MEDICINE | Facility: CLINIC | Age: 50
End: 2025-02-17
Payer: COMMERCIAL

## 2025-02-17 VITALS
HEIGHT: 69 IN | WEIGHT: 235.89 LBS | SYSTOLIC BLOOD PRESSURE: 158 MMHG | OXYGEN SATURATION: 96 % | TEMPERATURE: 98 F | DIASTOLIC BLOOD PRESSURE: 107 MMHG | BODY MASS INDEX: 34.94 KG/M2 | HEART RATE: 85 BPM

## 2025-02-17 DIAGNOSIS — G47.33 OBSTRUCTIVE SLEEP APNEA (ADULT) (PEDIATRIC): ICD-10-CM

## 2025-02-17 DIAGNOSIS — G47.30 SLEEP APNEA, UNSPECIFIED TYPE: ICD-10-CM

## 2025-02-17 DIAGNOSIS — G47.00 INSOMNIA, UNSPECIFIED TYPE: ICD-10-CM

## 2025-02-17 PROCEDURE — 95810 POLYSOM 6/> YRS 4/> PARAM: CPT | Performed by: INTERNAL MEDICINE

## 2025-02-17 ASSESSMENT — SLEEP AND FATIGUE QUESTIONNAIRES
SITING INACTIVE IN A PUBLIC PLACE LIKE A CLASS ROOM OR A MOVIE THEATER: WOULD NEVER DOZE
HOW LIKELY ARE YOU TO NOD OFF OR FALL ASLEEP WHILE WATCHING TV: MODERATE CHANCE OF DOZING
HOW LIKELY ARE YOU TO NOD OFF OR FALL ASLEEP WHILE SITTING AND READING: WOULD NEVER DOZE
HOW LIKELY ARE YOU TO NOD OFF OR FALL ASLEEP WHILE SITTING AND TALKING TO SOMEONE: WOULD NEVER DOZE
HOW LIKELY ARE YOU TO NOD OFF OR FALL ASLEEP WHILE SITTING QUIETLY AFTER LUNCH WITHOUT ALCOHOL: WOULD NEVER DOZE
HOW LIKELY ARE YOU TO NOD OFF OR FALL ASLEEP IN A CAR, WHILE STOPPED FOR A FEW MINUTES IN TRAFFIC: WOULD NEVER DOZE
HOW LIKELY ARE YOU TO NOD OFF OR FALL ASLEEP WHILE LYING DOWN TO REST IN THE AFTERNOON WHEN CIRCUMSTANCES PERMIT: WOULD NEVER DOZE
HOW LIKELY ARE YOU TO NOD OFF OR FALL ASLEEP WHEN YOU ARE A PASSENGER IN A CAR FOR AN HOUR WITHOUT A BREAK: WOULD NEVER DOZE
ESS-CHAD TOTAL SCORE: 2

## 2025-02-18 ENCOUNTER — APPOINTMENT (OUTPATIENT)
Dept: PHYSICAL THERAPY | Facility: CLINIC | Age: 50
End: 2025-02-18
Payer: COMMERCIAL

## 2025-02-18 NOTE — PROGRESS NOTES
[facility] TECH NOTE:     Patient: Leopoldo Ko   MRN//AGE: [id number]  1975  49 y.o.   Technologist: RADHA GARZA   Room: 2   Service Date: 2025        Sleep Testing Location: [place] [address]     [place]: 2    TECHNOLOGIST SLEEP STUDY PROCEDURE NOTE:   This sleep study is being conducted according to the policies and procedures outlined by the AAS accreditation standards.  The sleep study procedure and processes involved during this appointment was explained to the patient questions were answered. The patient verbalized understanding.      The patient is a 49 y.o. year old male scheduled for a diagnostic psg with [facility]      The study that was ultimately completed was  a diagnostic psg with [facility]      The full study Was completed.  Patient questionnaires completed?: yes     Consents signed? yes    Initial Fall Risk Screening:     Leopoldo has not fallen in the last 6 months. Leopoldo does not have a fear of falling. He does not need assistance with sitting, standing, or walking. he does not need assistance walking in his home. he does not need assistance in an unfamiliar setting. The patient is not using an assistive device.     Brief Study observations: Mr. Ko was educated on a diagnostic psg study. He stated he had a home sleep test and it came back inconclusive. He stated it depends when he goes to bed but every morning he wakes up around 0300. He sometimes can fall asleep and other times he is up for a few hours. He did request to wake up around 4: if he wasn't already awake. He did take his ambien before bed. During his study he experienced hypopneas with arousals and continuous snoring.    After the procedure, the patient was informed to ensure followup with ordering clinician for testing results.      Technologist: RADHA GARZA

## 2025-02-24 DIAGNOSIS — E78.2 MIXED HYPERLIPIDEMIA: ICD-10-CM

## 2025-02-24 RX ORDER — ROSUVASTATIN CALCIUM 10 MG/1
10 TABLET, COATED ORAL DAILY
Qty: 90 TABLET | Refills: 0 | Status: SHIPPED | OUTPATIENT
Start: 2025-02-24

## 2025-02-24 NOTE — PROGRESS NOTES
Entered in error.

## 2025-02-25 ENCOUNTER — DOCUMENTATION (OUTPATIENT)
Dept: PHYSICAL THERAPY | Facility: CLINIC | Age: 50
End: 2025-02-25

## 2025-02-25 ENCOUNTER — APPOINTMENT (OUTPATIENT)
Dept: SLEEP MEDICINE | Facility: CLINIC | Age: 50
End: 2025-02-25
Payer: COMMERCIAL

## 2025-02-25 ENCOUNTER — APPOINTMENT (OUTPATIENT)
Dept: PHYSICAL THERAPY | Facility: CLINIC | Age: 50
End: 2025-02-25
Payer: COMMERCIAL

## 2025-02-25 DIAGNOSIS — M75.81 ROTATOR CUFF TENDINITIS, RIGHT: Primary | ICD-10-CM

## 2025-03-18 ENCOUNTER — APPOINTMENT (OUTPATIENT)
Dept: SLEEP MEDICINE | Facility: CLINIC | Age: 50
End: 2025-03-18
Payer: COMMERCIAL

## 2025-03-18 DIAGNOSIS — G47.19 EXCESSIVE DAYTIME SLEEPINESS: ICD-10-CM

## 2025-03-18 DIAGNOSIS — G47.00 INSOMNIA, UNSPECIFIED TYPE: ICD-10-CM

## 2025-03-18 DIAGNOSIS — G47.33 OBSTRUCTIVE SLEEP APNEA (ADULT) (PEDIATRIC): Primary | ICD-10-CM

## 2025-03-18 PROCEDURE — 99214 OFFICE O/P EST MOD 30 MIN: CPT | Performed by: INTERNAL MEDICINE

## 2025-03-18 PROCEDURE — 1036F TOBACCO NON-USER: CPT | Performed by: INTERNAL MEDICINE

## 2025-03-18 NOTE — ASSESSMENT & PLAN NOTE
- I reviewed the results of the sleep study in detail with Leopoldo   - The patient has moderate obstructive sleep apnea with a positional component and requires treatment.  - We discussed consideration of a positional device like ZZOMA or tennis ball in pocket T-shirt  - He would like to pursue PAP therapy  - Start  Auto PAP 5-15 cm H20 through [employer].  - Sleep apnea and PAP therapy education was provided at length in clinic today. Leopoldo  verbalized understanding.  -Leopoldo verbalized understanding.  Follow-up in 31 - 90 days

## 2025-03-18 NOTE — PROGRESS NOTES
Patient: Leopoldo Ko    [id number]  : 1975 -- AGE 49 y.o.    Provider: Emery Sin MD     Location [facility]   Service Date: 3/18/2025              [facility]  Followup Visit Note      Virtual or Telephone Consent  An interactive audio and video telecommunication system which permits real time communications between the patient (at the originating site) and provider (at the distant site) was utilized to provide this telehealth service.   Verbal consent was requested and obtained from Leopoldo Ko on this date, 25 for a telehealth visit and the patient's location was confirmed at the time of the visit.     Subjective   Patient ID: Leopoldo Ko is a 49 y.o. male who presents for Sleep Apnea and Pap Adherence Followup (Compliance available in chart ).  HPI    Prior Sleep History:  Prior sleep study results:   10/28/2024: HSAT: AHI 3% 2.1, AHI 4% 2.1, JUAN ANTONIO 0.0  2024: Office Visit: Dr. Emery Sin:On today's visit, the patient presents to discuss Insomnia, Excessive Daytime Sleepiness, Unrefreshing Sleep, and waking up at night      Sleep schedule  on weekdays / work days:  Usual Bedtime 9 to 11 PM  Falls asleep around?  Wake time 2:20 AM to 4 AM  Naps  No      Total sleep time average is ? Hours/day  He previously would go to bed. Having difficulty getting back to sleep.   He lived in [place] in .   Deployed with the [employer]  - .  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   ESS 5, JAIDA 23, FOSQ 20     Suspect advanced sleep phase disorder.  Condition started time he was around the [employer].  Decrease electronic use close to bedtime.  Provided sleep diaries and close clinical follow-up 2024: Office Visit: Dr. Emery Sin: Purpose of today's visit was his insomnia. He did not fill out his sleep diaries as requested  On today's visit,  the patient reports continued difficulty with sleep maintenance  He reports that he is waking up every night, regardless of the time that falls asleep  Regardless of alcohol or not, or caffeine intake he continues to wake up and has difficulty getting back to sleep   12/19/2024: Office Visit: Dr. Emery Sin:Purpose of today's visit was his insomnia. He did not fill out his sleep diaries as requested  On today's visit, the patient reports continued difficulty with sleep maintenance  He reports that he is waking up every night, regardless of the time that falls asleep  Regardless of alcohol or not, or caffeine intake he continues to wake up and has difficulty getting back to sleep     Sleep Schedule: He goes to bed at 10 PM and his sleep latency is 30 -60 minutes He wakes by  2:30 AM, has difficulty getting back to sleep. He tends to lay in bed and may pace, clean, takes a hot bath for a few hours, he is unable to fall asleep, turns TV off. He will fall back asleep, sleeps for 1.5 hours, typically is waking about 6:30 - 7 AM and then he has difficulty functioning in the morning.  He gets about 4 hours of sleep per night.  He tries not disturb         ESS: 6   Insomnia, unspecified G47.00.  Following some stimulus control, reinforced the need to work on getting out of bed when waking in the middle of the night and being inactive. Will trial Ambien. Controlled substance agreement (CSA) sent to review and sign. Urine drug screen ordered as part of CSA. Will send medication once I receive the results of the urine drug screen     Sleep apnea, unspecified G47.30.   He had a negative HSAT, but continues to have sleep disruption. Needs in lab testing, suspicion for LYNETTE and evaluate for other sleep disorder.  Leopoldo verbalized understanding    Current Sleep History:  Leopoldo presents for review of sleep study and discuss management.  He had a sleep study on 2/17/2025: Diagnostic polysomnography: AHI 3%: 16.7, AHI 4% 10.8,  "SpO2 kyle 77%.  Oxygen saturation less than or equal to 88% for 2.0 minutes. ESS: 2     Review of Systems  Review of systems negative except as per HPI  Objective   There were no vitals taken for this visit.   PREVIOUS WEIGHTS:  Wt Readings from Last 3 Encounters:   02/17/25 107 kg (235 lb 14.3 oz)   12/03/24 107 kg (235 lb)   11/25/24 109 kg (240 lb)     Physical Exam  PHYSICAL EXAM: GENERAL: alert pleasant and cooperative no acute distress  PSYCH EXAM: alert,oriented, in NAD with a full range of affect, normal behavior and no psychotic features    No results found for: \"IRON\", \"TIBC\", \"FERRITIN\"     Assessment/Plan   Problem List Items Addressed This Visit             ICD-10-CM    Insomnia, unspecified G47.00     Suspect this is largely due to untreated LYNETTE, will reevaluate         Relevant Orders    Positive Airway Pressure (PAP) Therapy    Obstructive sleep apnea (adult) (pediatric) - Primary G47.33     - I reviewed the results of the sleep study in detail with Leopoldo   - The patient has moderate obstructive sleep apnea with a positional component and requires treatment.  - We discussed consideration of a positional device like ZZOMA or tennis ball in pocket T-shirt  - He would like to pursue PAP therapy  - Start  Auto PAP 5-15 cm H20 through [employer].  - Sleep apnea and PAP therapy education was provided at length in clinic today. Leopoldo  verbalized understanding.  -Leopoldo verbalized understanding.  Follow-up in 31 - 90 days          Relevant Orders    Positive Airway Pressure (PAP) Therapy    Excessive daytime sleepiness G47.19     Likely due to poor sleep due to LYNETTE         Relevant Orders    Positive Airway Pressure (PAP) Therapy          " no

## 2025-04-16 DIAGNOSIS — I10 HYPERTENSION, UNSPECIFIED TYPE: ICD-10-CM

## 2025-04-16 RX ORDER — VALSARTAN AND HYDROCHLOROTHIAZIDE 80; 12.5 MG/1; MG/1
1 TABLET, FILM COATED ORAL DAILY
Qty: 30 TABLET | Refills: 0 | Status: SHIPPED | OUTPATIENT
Start: 2025-04-16

## 2025-06-30 ENCOUNTER — APPOINTMENT (OUTPATIENT)
Dept: SLEEP MEDICINE | Facility: CLINIC | Age: 50
End: 2025-06-30
Payer: COMMERCIAL

## (undated) DEVICE — DRAPE, U-DRAPE, NON STERILE

## (undated) DEVICE — BLADE, STRYKER, 5.5MM RESECTOR, F-SERIES

## (undated) DEVICE — NEEDLE, SPINAL, QUINCKE, 18 G X 3.5 IN, PINK HUB

## (undated) DEVICE — DRESSING, ABDOMINAL, TENDERSORB, 8 X 7-1/2 IN, STERILE

## (undated) DEVICE — Device

## (undated) DEVICE — SLEEVE, ARM, LAERAL TRACTION

## (undated) DEVICE — DRAPE, INCISE, ANTIMICROBIAL, IOBAN 2, STERI DRAPE, 23 X 33 IN, DISPOSABLE, STERILE

## (undated) DEVICE — APPLICATOR, CHLORAPREP, W/ORANGE TINT, 26ML

## (undated) DEVICE — TUBING, PUMP MAIN 16FT STERILE

## (undated) DEVICE — PROTECTOR, HEEL/ANKLE/ELBOW, UNIVERSAL

## (undated) DEVICE — ADAPTER, Y TUBING STERILE

## (undated) DEVICE — DRAPE, SHEET, U, W/ADHESIVE STRIP, IMPERVIOUS, 60 X 70 IN, DISPOSABLE, LF, STERILE

## (undated) DEVICE — DRESSING, GAUZE, PETROLATUM, PATCH, XEROFORM, 1 X 8 IN, STERILE

## (undated) DEVICE — DRESSING, GAUZE, SPONGE, 12 PLY, CURITY, 4 X 4 IN, RIGID TRAY, STERILE

## (undated) DEVICE — SLEEVE, VASO PRESS, CALF GARMENT, MEDIUM, GREEN

## (undated) DEVICE — TUBING, CLEAR N-COND, 5MM X 10, LF

## (undated) DEVICE — BLADE, STRYKER, 4.0MM, RESECTOR, F-SERIES

## (undated) DEVICE — DRAPE, TIBURON, SPLIT SHEET, REINF ADH STRIP, 77X108

## (undated) DEVICE — GOWN, SMARTY, XXL, X-LONG

## (undated) DEVICE — PROBE, APOLLO RF, 90 DEG, EXTRA LARTGE

## (undated) DEVICE — CONNECTOR, 5 IN 1, STERILE

## (undated) DEVICE — BURR, STRYKER, 5.5MM, BRL 6FLT